# Patient Record
Sex: FEMALE | Race: WHITE | NOT HISPANIC OR LATINO | Employment: FULL TIME | URBAN - METROPOLITAN AREA
[De-identification: names, ages, dates, MRNs, and addresses within clinical notes are randomized per-mention and may not be internally consistent; named-entity substitution may affect disease eponyms.]

---

## 2017-05-09 RX ORDER — LANSOPRAZOLE 30 MG/1
30 CAPSULE, DELAYED RELEASE ORAL DAILY
Qty: 30 CAPSULE | Refills: 0 | Status: SHIPPED | OUTPATIENT
Start: 2017-05-09 | End: 2017-06-01 | Stop reason: SDUPTHER

## 2017-05-15 RX ORDER — LANSOPRAZOLE 30 MG/1
30 CAPSULE, DELAYED RELEASE ORAL DAILY
Qty: 30 CAPSULE | Refills: 0 | Status: SHIPPED | OUTPATIENT
Start: 2017-05-15 | End: 2017-05-17 | Stop reason: SDUPTHER

## 2017-05-17 RX ORDER — LANSOPRAZOLE 30 MG/1
30 CAPSULE, DELAYED RELEASE ORAL DAILY
Qty: 90 CAPSULE | Refills: 0 | Status: SHIPPED | OUTPATIENT
Start: 2017-05-17 | End: 2017-06-01 | Stop reason: SDUPTHER

## 2017-05-30 DIAGNOSIS — K21.9 GASTROESOPHAGEAL REFLUX DISEASE WITHOUT ESOPHAGITIS: ICD-10-CM

## 2017-05-30 DIAGNOSIS — E78.5 HYPERLIPIDEMIA, UNSPECIFIED HYPERLIPIDEMIA TYPE: Primary | ICD-10-CM

## 2017-05-31 LAB
ALBUMIN SERPL-MCNC: 4.6 G/DL (ref 3.5–5.2)
ALBUMIN/GLOB SERPL: 1.6 G/DL
ALP SERPL-CCNC: 60 U/L (ref 39–117)
ALT SERPL-CCNC: 7 U/L (ref 1–33)
AST SERPL-CCNC: 15 U/L (ref 1–32)
BASOPHILS # BLD AUTO: 0.06 10*3/MM3 (ref 0–0.2)
BASOPHILS NFR BLD AUTO: 1.1 % (ref 0–1.5)
BILIRUB SERPL-MCNC: 0.4 MG/DL (ref 0.1–1.2)
BUN SERPL-MCNC: 13 MG/DL (ref 6–20)
BUN/CREAT SERPL: 16.5 (ref 7–25)
CALCIUM SERPL-MCNC: 9.6 MG/DL (ref 8.6–10.5)
CHLORIDE SERPL-SCNC: 101 MMOL/L (ref 98–107)
CHOLEST SERPL-MCNC: 193 MG/DL (ref 0–200)
CO2 SERPL-SCNC: 25.6 MMOL/L (ref 22–29)
CREAT SERPL-MCNC: 0.79 MG/DL (ref 0.57–1)
EOSINOPHIL # BLD AUTO: 0.36 10*3/MM3 (ref 0–0.7)
EOSINOPHIL NFR BLD AUTO: 6.8 % (ref 0.3–6.2)
ERYTHROCYTE [DISTWIDTH] IN BLOOD BY AUTOMATED COUNT: 13.1 % (ref 11.7–13)
GLOBULIN SER CALC-MCNC: 2.8 GM/DL
GLUCOSE SERPL-MCNC: 94 MG/DL (ref 65–99)
HCT VFR BLD AUTO: 40.8 % (ref 35.6–45.5)
HDLC SERPL-MCNC: 75 MG/DL (ref 40–60)
HGB BLD-MCNC: 13.3 G/DL (ref 11.9–15.5)
IMM GRANULOCYTES # BLD: 0 10*3/MM3 (ref 0–0.03)
IMM GRANULOCYTES NFR BLD: 0 % (ref 0–0.5)
INTERPRETATION: NORMAL
LDLC SERPL CALC-MCNC: 103 MG/DL (ref 0–100)
LYMPHOCYTES # BLD AUTO: 1.53 10*3/MM3 (ref 0.9–4.8)
LYMPHOCYTES NFR BLD AUTO: 28.7 % (ref 19.6–45.3)
MCH RBC QN AUTO: 31 PG (ref 26.9–32)
MCHC RBC AUTO-ENTMCNC: 32.6 G/DL (ref 32.4–36.3)
MCV RBC AUTO: 95.1 FL (ref 80.5–98.2)
MONOCYTES # BLD AUTO: 0.37 10*3/MM3 (ref 0.2–1.2)
MONOCYTES NFR BLD AUTO: 6.9 % (ref 5–12)
NEUTROPHILS # BLD AUTO: 3.01 10*3/MM3 (ref 1.9–8.1)
NEUTROPHILS NFR BLD AUTO: 56.5 % (ref 42.7–76)
PLATELET # BLD AUTO: 192 10*3/MM3 (ref 140–500)
POTASSIUM SERPL-SCNC: 4.4 MMOL/L (ref 3.5–5.2)
PROT SERPL-MCNC: 7.4 G/DL (ref 6–8.5)
RBC # BLD AUTO: 4.29 10*6/MM3 (ref 3.9–5.2)
SODIUM SERPL-SCNC: 140 MMOL/L (ref 136–145)
TRIGL SERPL-MCNC: 77 MG/DL (ref 0–150)
VLDLC SERPL CALC-MCNC: 15.4 MG/DL (ref 5–40)
WBC # BLD AUTO: 5.33 10*3/MM3 (ref 4.5–10.7)

## 2017-06-01 ENCOUNTER — OFFICE VISIT (OUTPATIENT)
Dept: FAMILY MEDICINE CLINIC | Facility: CLINIC | Age: 47
End: 2017-06-01

## 2017-06-01 VITALS
WEIGHT: 126 LBS | OXYGEN SATURATION: 98 % | SYSTOLIC BLOOD PRESSURE: 108 MMHG | HEIGHT: 64 IN | HEART RATE: 94 BPM | BODY MASS INDEX: 21.51 KG/M2 | DIASTOLIC BLOOD PRESSURE: 80 MMHG | RESPIRATION RATE: 14 BRPM | TEMPERATURE: 98.4 F

## 2017-06-01 DIAGNOSIS — H60.542 ECZEMA OF EXTERNAL EAR, LEFT: ICD-10-CM

## 2017-06-01 DIAGNOSIS — K21.9 GASTROESOPHAGEAL REFLUX DISEASE WITHOUT ESOPHAGITIS: Primary | ICD-10-CM

## 2017-06-01 DIAGNOSIS — L40.9 PSORIASIS OF SCALP: ICD-10-CM

## 2017-06-01 PROBLEM — M11.20 PSEUDOGOUT: Status: ACTIVE | Noted: 2017-03-20

## 2017-06-01 PROCEDURE — 99214 OFFICE O/P EST MOD 30 MIN: CPT | Performed by: INTERNAL MEDICINE

## 2017-06-01 RX ORDER — LANSOPRAZOLE 30 MG/1
30 CAPSULE, DELAYED RELEASE ORAL DAILY
Qty: 90 CAPSULE | Refills: 1 | Status: SHIPPED | OUTPATIENT
Start: 2017-06-01 | End: 2018-02-01 | Stop reason: SDUPTHER

## 2017-06-01 NOTE — PROGRESS NOTES
Subjective if complaint is a refill on medicine for reflux  Edna Moreno is a 46 y.o. female.     History of Present Illness   Edna is here today for a medication refill.  She basically has been feeling well.  Her lansoprazole controls her reflux fairly well.  She did have some blood work drawn a few days ago.  Her LDL cholesterol is minimally elevated 103 but her HDL was 75 and her total cholesterol was normal.  Her chemistries looked okay and her blood count looked okay other than an elevated eosinophil count.  She is complaining of some itching in her left ear.  She is also developed what she thinks is psoriasis on the scalp.  Since her last visit she did have surgery on a nodule at the base of her right thumb.  This came back as calcifications consistent with pseudogout.  The following portions of the patient's history were reviewed and updated as appropriate: allergies, current medications, past medical history, past surgical history and problem list.    Review of Systems   Respiratory: Negative for chest tightness and shortness of breath.    Cardiovascular: Negative for chest pain and leg swelling.   Gastrointestinal: Negative for blood in stool.   Genitourinary:        She will be getting her Pap smear this afternoon.   Neurological: Negative for dizziness, light-headedness and headaches.       Objective   Physical Exam   Constitutional: She appears well-developed and well-nourished.   Neck: Carotid bruit is not present. No thyromegaly present.   Cardiovascular: Normal rate, regular rhythm and normal heart sounds.  Exam reveals no gallop and no friction rub.    No murmur heard.  Pulmonary/Chest: Effort normal and breath sounds normal. No respiratory distress. She has no wheezes. She has no rales.   Abdominal: Soft. Bowel sounds are normal. She exhibits no distension. There is no tenderness.   Musculoskeletal: She exhibits no edema.   Is mild crepitation of both knees.  Good range of motion   Skin:    There appears to be an eczematous dermatitis in the left external auditory canal.  She does have some scalp psoriasis.   Nursing note and vitals reviewed.      Assessment/Plan   Edna was seen today for med management and ear problem.    Diagnoses and all orders for this visit:    Gastroesophageal reflux disease without esophagitis    Eczema of external ear, left    Psoriasis of scalp    Other orders  -     lansoprazole (PREVACID) 30 MG capsule; Take 1 capsule by mouth Daily.  -     triamcinolone (KENALOG) 0.1 % ointment; Apply  topically 2 (Two) Times a Day.        Edna is here today for a follow-up.  We did renew her medicine for her reflux.  I am prescribing some triamcinolone ointment for her eczema  Alternative now.  She is going to try some shampoo for the scalp psoriasis.  If it does not improve with this then we will give her a prescription strength medicine.

## 2018-02-01 RX ORDER — LANSOPRAZOLE 30 MG/1
30 CAPSULE, DELAYED RELEASE ORAL DAILY
Qty: 30 CAPSULE | Refills: 0 | Status: SHIPPED | OUTPATIENT
Start: 2018-02-01 | End: 2018-02-19 | Stop reason: SDUPTHER

## 2018-02-19 ENCOUNTER — OFFICE VISIT (OUTPATIENT)
Dept: FAMILY MEDICINE CLINIC | Facility: CLINIC | Age: 48
End: 2018-02-19

## 2018-02-19 VITALS
OXYGEN SATURATION: 99 % | DIASTOLIC BLOOD PRESSURE: 76 MMHG | SYSTOLIC BLOOD PRESSURE: 120 MMHG | HEIGHT: 64 IN | TEMPERATURE: 98.3 F | BODY MASS INDEX: 22.02 KG/M2 | WEIGHT: 129 LBS | HEART RATE: 73 BPM

## 2018-02-19 DIAGNOSIS — E78.5 HYPERLIPIDEMIA, UNSPECIFIED HYPERLIPIDEMIA TYPE: ICD-10-CM

## 2018-02-19 DIAGNOSIS — R07.89 CHEST WALL PAIN: ICD-10-CM

## 2018-02-19 DIAGNOSIS — R10.11 RIGHT UPPER QUADRANT PAIN: ICD-10-CM

## 2018-02-19 DIAGNOSIS — K21.9 GASTROESOPHAGEAL REFLUX DISEASE WITHOUT ESOPHAGITIS: Primary | ICD-10-CM

## 2018-02-19 PROCEDURE — 99214 OFFICE O/P EST MOD 30 MIN: CPT | Performed by: INTERNAL MEDICINE

## 2018-02-19 RX ORDER — LANSOPRAZOLE 30 MG/1
30 CAPSULE, DELAYED RELEASE ORAL DAILY
Qty: 90 CAPSULE | Refills: 3 | Status: SHIPPED | OUTPATIENT
Start: 2018-02-19 | End: 2019-03-07 | Stop reason: SDUPTHER

## 2018-02-19 RX ORDER — ALUMINUM CHLORIDE 20 %
SOLUTION, NON-ORAL TOPICAL
Refills: 12 | COMMUNITY
Start: 2018-01-09 | End: 2022-09-21

## 2018-02-19 NOTE — PROGRESS NOTES
Subjective chief complaint is medication refill  Edna Moreno is a 47 y.o. female.     History of Present Illness   Edna is here today for need of a medication refill on her lansoprazole.  She has been on this for reflux for a number of years.  She is recently become concerned about the possibility of vitamin B12 deficiency or magnesium deficiency based on this.  She is not really having any symptoms related to this.  She is having various aches and pains.  She has had some discomfort in her left posterior rib cage since approximately [place].  Taking 7 days of meloxicam did seem to help a little bit with this but it is not completely gone.  She is also having some intermittent right upper quadrant pain after eating.  She had a normal ultrasound of the abdomen in 2014.  She is not having any blood in her stool.  She has no history of ulcers.  She has these episodes maybe a few times per month.  She also is complaining of some tenderness elbow which comes and goes.  She is now getting some medial epicondylitis type symptoms as well.  She is complaining of some pain in her back.  This is in the region of the posterior superior iliac crest.  She is also complaining of some left knee discomfort.  Around [place] she did have some problems with an irregular heartbeat.  One of these times was associated with some increased alcohol use but the other time was not.  The following portions of the patient's history were reviewed and updated as appropriate: allergies, current medications, past medical history, past surgical history and problem list.    Review of Systems   Respiratory: Negative for chest tightness and shortness of breath.    Cardiovascular: Positive for palpitations. Negative for chest pain.   Gastrointestinal: Positive for abdominal pain. Negative for blood in stool and diarrhea.   Musculoskeletal: Positive for myalgias.   Neurological: Negative for dizziness, light-headedness and headaches.        Objective   Physical Exam   Constitutional: She appears well-developed and well-nourished.   Neck: Carotid bruit is not present. No thyromegaly present.   Cardiovascular: Normal rate, regular rhythm, normal heart sounds and intact distal pulses.  Exam reveals no gallop and no friction rub.    No murmur heard.  Pulmonary/Chest: Effort normal and breath sounds normal. No respiratory distress. She has no wheezes. She has no rales.   Abdominal: Soft. Bowel sounds are normal. She exhibits no distension. There is no tenderness. There is no rebound and no guarding.   Naranjo's sign is negative.   Musculoskeletal: She exhibits no edema.   She has normal range of motion of the shoulders elbows knees and hips.  There is some minor crepitation of the knees.  I do not find any focal tenderness in terms of her back discomfort.     Nursing note and vitals reviewed.      Assessment/Plan   Edna was seen today for med refill.    Diagnoses and all orders for this visit:    Gastroesophageal reflux disease without esophagitis  -     CBC & Differential  -     Vitamin B12  -     Methylmalonic Acid, Serum  -     Magnesium  -     Vitamin D 25 Hydroxy    Right upper quadrant pain  -     NM HIDA scan with pharmacological intervention; Future    Hyperlipidemia, unspecified hyperlipidemia type  -     Comprehensive Metabolic Panel  -     Lipid Panel    Chest wall pain  -     Vitamin D 25 Hydroxy    Other orders  -     lansoprazole (PREVACID) 30 MG capsule; Take 1 capsule by mouth Daily. Needs appt      Edna is here today for follow-up.  We did renew her Prevacid.  We are going to check vitamin B12 and magnesium levels because of her concern.  She does have some right upper quadrant discomfort that is intermittent.  She had a normal ultrasound of the gallbladder in the past.  We are going to perform a HIDA scan.  Her other various aches and pains I believe her just musculoskeletal aches and pains.

## 2018-02-21 LAB
25(OH)D3+25(OH)D2 SERPL-MCNC: 22 NG/ML (ref 30–100)
ALBUMIN SERPL-MCNC: 4.3 G/DL (ref 3.5–5.2)
ALBUMIN/GLOB SERPL: 2 G/DL
ALP SERPL-CCNC: 65 U/L (ref 39–117)
ALT SERPL-CCNC: 17 U/L (ref 1–33)
AST SERPL-CCNC: 19 U/L (ref 1–32)
BASOPHILS # BLD AUTO: 0.03 10*3/MM3 (ref 0–0.2)
BASOPHILS NFR BLD AUTO: 0.7 % (ref 0–1.5)
BILIRUB SERPL-MCNC: 0.3 MG/DL (ref 0.1–1.2)
BUN SERPL-MCNC: 10 MG/DL (ref 6–20)
BUN/CREAT SERPL: 14.1 (ref 7–25)
CALCIUM SERPL-MCNC: 9 MG/DL (ref 8.6–10.5)
CHLORIDE SERPL-SCNC: 104 MMOL/L (ref 98–107)
CHOLEST SERPL-MCNC: 157 MG/DL (ref 0–200)
CO2 SERPL-SCNC: 26.3 MMOL/L (ref 22–29)
CREAT SERPL-MCNC: 0.71 MG/DL (ref 0.57–1)
EOSINOPHIL # BLD AUTO: 0.19 10*3/MM3 (ref 0–0.7)
EOSINOPHIL NFR BLD AUTO: 4.3 % (ref 0.3–6.2)
ERYTHROCYTE [DISTWIDTH] IN BLOOD BY AUTOMATED COUNT: 13.5 % (ref 11.7–13)
GFR SERPLBLD CREATININE-BSD FMLA CKD-EPI: 106 ML/MIN/1.73
GFR SERPLBLD CREATININE-BSD FMLA CKD-EPI: 87 ML/MIN/1.73
GLOBULIN SER CALC-MCNC: 2.2 GM/DL
GLUCOSE SERPL-MCNC: 89 MG/DL (ref 65–99)
HCT VFR BLD AUTO: 37.7 % (ref 35.6–45.5)
HDLC SERPL-MCNC: 56 MG/DL (ref 40–60)
HGB BLD-MCNC: 12.2 G/DL (ref 11.9–15.5)
IMM GRANULOCYTES # BLD: 0 10*3/MM3 (ref 0–0.03)
IMM GRANULOCYTES NFR BLD: 0 % (ref 0–0.5)
INTERPRETATION: NORMAL
LDLC SERPL CALC-MCNC: 89 MG/DL (ref 0–100)
LYMPHOCYTES # BLD AUTO: 1.31 10*3/MM3 (ref 0.9–4.8)
LYMPHOCYTES NFR BLD AUTO: 29.8 % (ref 19.6–45.3)
MAGNESIUM SERPL-MCNC: 2 MG/DL (ref 1.6–2.6)
MCH RBC QN AUTO: 31.1 PG (ref 26.9–32)
MCHC RBC AUTO-ENTMCNC: 32.4 G/DL (ref 32.4–36.3)
MCV RBC AUTO: 96.2 FL (ref 80.5–98.2)
METHYLMALONATE SERPL-SCNC: 310 NMOL/L (ref 0–378)
MONOCYTES # BLD AUTO: 0.32 10*3/MM3 (ref 0.2–1.2)
MONOCYTES NFR BLD AUTO: 7.3 % (ref 5–12)
NEUTROPHILS # BLD AUTO: 2.54 10*3/MM3 (ref 1.9–8.1)
NEUTROPHILS NFR BLD AUTO: 57.9 % (ref 42.7–76)
PLATELET # BLD AUTO: 198 10*3/MM3 (ref 140–500)
POTASSIUM SERPL-SCNC: 3.7 MMOL/L (ref 3.5–5.2)
PROT SERPL-MCNC: 6.5 G/DL (ref 6–8.5)
RBC # BLD AUTO: 3.92 10*6/MM3 (ref 3.9–5.2)
SODIUM SERPL-SCNC: 143 MMOL/L (ref 136–145)
TRIGL SERPL-MCNC: 58 MG/DL (ref 0–150)
VIT B12 SERPL-MCNC: 263 PG/ML (ref 211–946)
VLDLC SERPL CALC-MCNC: 11.6 MG/DL (ref 5–40)
WBC # BLD AUTO: 4.39 10*3/MM3 (ref 4.5–10.7)

## 2018-02-28 ENCOUNTER — HOSPITAL ENCOUNTER (OUTPATIENT)
Dept: NUCLEAR MEDICINE | Facility: HOSPITAL | Age: 48
Discharge: HOME | End: 2018-02-28
Attending: INTERNAL MEDICINE

## 2018-02-28 DIAGNOSIS — R10.11 RIGHT UPPER QUADRANT PAIN: ICD-10-CM

## 2018-02-28 PROCEDURE — 0 TECHNETIUM TC 99M MEBROFENIN KIT: Performed by: INTERNAL MEDICINE

## 2018-02-28 PROCEDURE — A9537 TC99M MEBROFENIN: HCPCS | Performed by: INTERNAL MEDICINE

## 2018-02-28 PROCEDURE — 78227 HEPATOBIL SYST IMAGE W/DRUG: CPT

## 2018-02-28 PROCEDURE — 25010000002 SINCALIDE PER 5 MCG: Performed by: INTERNAL MEDICINE

## 2018-02-28 RX ORDER — KIT FOR THE PREPARATION OF TECHNETIUM TC 99M MEBROFENIN 45 MG/10ML
1 INJECTION, POWDER, LYOPHILIZED, FOR SOLUTION INTRAVENOUS
Status: COMPLETED | OUTPATIENT
Start: 2018-02-28 | End: 2018-02-28

## 2018-02-28 RX ADMIN — SINCALIDE 1.2 MCG: 5 INJECTION, POWDER, LYOPHILIZED, FOR SOLUTION INTRAVENOUS at 09:30

## 2018-02-28 RX ADMIN — MEBROFENIN 1 DOSE: 45 INJECTION, POWDER, LYOPHILIZED, FOR SOLUTION INTRAVENOUS at 07:45

## 2018-03-01 ENCOUNTER — APPOINTMENT (OUTPATIENT)
Dept: NUCLEAR MEDICINE | Facility: HOSPITAL | Age: 48
End: 2018-03-01
Attending: INTERNAL MEDICINE

## 2019-02-27 RX ORDER — LANSOPRAZOLE 30 MG/1
CAPSULE, DELAYED RELEASE ORAL
Qty: 90 CAPSULE | Refills: 3 | OUTPATIENT
Start: 2019-02-27

## 2019-03-07 ENCOUNTER — OFFICE VISIT (OUTPATIENT)
Dept: FAMILY MEDICINE CLINIC | Facility: CLINIC | Age: 49
End: 2019-03-07

## 2019-03-07 VITALS
HEART RATE: 77 BPM | HEIGHT: 64 IN | BODY MASS INDEX: 22.94 KG/M2 | WEIGHT: 134.4 LBS | SYSTOLIC BLOOD PRESSURE: 142 MMHG | TEMPERATURE: 98.5 F | DIASTOLIC BLOOD PRESSURE: 90 MMHG | OXYGEN SATURATION: 99 %

## 2019-03-07 DIAGNOSIS — Z12.11 SCREENING FOR COLON CANCER: ICD-10-CM

## 2019-03-07 DIAGNOSIS — E78.5 HYPERLIPIDEMIA, UNSPECIFIED HYPERLIPIDEMIA TYPE: ICD-10-CM

## 2019-03-07 DIAGNOSIS — M62.838 CERVICAL PARASPINAL MUSCLE SPASM: ICD-10-CM

## 2019-03-07 DIAGNOSIS — E55.9 VITAMIN D DEFICIENCY: ICD-10-CM

## 2019-03-07 DIAGNOSIS — K21.9 GASTROESOPHAGEAL REFLUX DISEASE WITHOUT ESOPHAGITIS: Primary | ICD-10-CM

## 2019-03-07 PROCEDURE — 99214 OFFICE O/P EST MOD 30 MIN: CPT | Performed by: INTERNAL MEDICINE

## 2019-03-07 RX ORDER — LANSOPRAZOLE 30 MG/1
30 CAPSULE, DELAYED RELEASE ORAL DAILY
Qty: 90 CAPSULE | Refills: 3 | Status: SHIPPED | OUTPATIENT
Start: 2019-03-07 | End: 2020-02-26

## 2019-03-07 RX ORDER — TIZANIDINE 2 MG/1
2 TABLET ORAL NIGHTLY PRN
Qty: 30 TABLET | Refills: 1 | Status: SHIPPED | OUTPATIENT
Start: 2019-03-07 | End: 2019-05-08 | Stop reason: SDUPTHER

## 2019-03-07 NOTE — PROGRESS NOTES
Subjective Chief complaint is check up on reflux  Edna Moreno is a 48 y.o. female.     History of Present Illness   Edna is here today for checkup on her reflux.  She basically has been feeling well.  She is on Prevacid long-term and she seems to be doing fine with that.  Her only new complaint is one of some occipital headaches primarily on the right side of her head.  She has been having them approximately twice per week and sometimes they can last for a day.  She is not having any other associated symptoms with this.   She is up-to-date on her gynecologic and breast exams.  She is due for a colonoscopy.  She has seen Dr. Omer for her upper endoscopies in the past.  The following portions of the patient's history were reviewed and updated as appropriate: allergies, current medications, past family history, past medical history, past social history, past surgical history and problem list.    Review of Systems   Respiratory: Negative for chest tightness and shortness of breath.    Cardiovascular: Negative for chest pain and leg swelling.   Gastrointestinal: Positive for indigestion. Negative for abdominal pain and blood in stool.   Genitourinary: Negative for dysuria and hematuria.   Neurological: Positive for headache.       Objective   Physical Exam   Constitutional: She appears well-developed and well-nourished.   Neck: Carotid bruit is not present. No thyromegaly present.   Cardiovascular: Normal rate, regular rhythm, normal heart sounds and intact distal pulses. Exam reveals no friction rub.   No murmur heard.  Pulmonary/Chest: Effort normal and breath sounds normal. No respiratory distress. She has no wheezes.   Abdominal: Soft. Bowel sounds are normal. She exhibits no distension and no mass. There is no tenderness. There is no guarding.   Musculoskeletal: She exhibits no edema.   She does have a fairly tense cervical paraspinous muscle spasm on the right-hand side near the occiput.   Nursing note  and vitals reviewed.        Assessment/Plan   Edna was seen today for follow-up and med refill.    Diagnoses and all orders for this visit:    Gastroesophageal reflux disease without esophagitis    Hyperlipidemia, unspecified hyperlipidemia type  -     Comprehensive Metabolic Panel  -     Lipid Panel    Cervical paraspinal muscle spasm    Screening for colon cancer  -     Ambulatory Referral to Gastroenterology    Vitamin D deficiency  -     Vitamin D 25 Hydroxy    Other orders  -     lansoprazole (PREVACID) 30 MG capsule; Take 1 capsule by mouth Daily. Needs appt  -     tiZANidine (ZANAFLEX) 2 MG tablet; Take 1 tablet by mouth At Night As Needed for Muscle Spasms.      Edna is here today for follow-up on her reflux.  We are going to check on this as well as her cholesterol.  I am going to prescribe some tizanidine for nighttime use for her cervical muscle spasm.  We will get her referred back to Dr. Omer for her colonoscopy.

## 2019-03-08 LAB
25(OH)D3+25(OH)D2 SERPL-MCNC: 24.8 NG/ML (ref 30–100)
ALBUMIN SERPL-MCNC: 4.3 G/DL (ref 3.5–5.2)
ALBUMIN/GLOB SERPL: 1.6 G/DL
ALP SERPL-CCNC: 63 U/L (ref 39–117)
ALT SERPL-CCNC: 8 U/L (ref 1–33)
AST SERPL-CCNC: 11 U/L (ref 1–32)
BILIRUB SERPL-MCNC: 0.3 MG/DL (ref 0.1–1.2)
BUN SERPL-MCNC: 9 MG/DL (ref 6–20)
BUN/CREAT SERPL: 11.8 (ref 7–25)
CALCIUM SERPL-MCNC: 9.3 MG/DL (ref 8.6–10.5)
CHLORIDE SERPL-SCNC: 102 MMOL/L (ref 98–107)
CHOLEST SERPL-MCNC: 202 MG/DL (ref 0–200)
CO2 SERPL-SCNC: 23 MMOL/L (ref 22–29)
CREAT SERPL-MCNC: 0.76 MG/DL (ref 0.57–1)
GLOBULIN SER CALC-MCNC: 2.7 GM/DL
GLUCOSE SERPL-MCNC: 89 MG/DL (ref 65–99)
HDLC SERPL-MCNC: 65 MG/DL (ref 40–60)
LDLC SERPL CALC-MCNC: 115 MG/DL (ref 0–100)
POTASSIUM SERPL-SCNC: 4.6 MMOL/L (ref 3.5–5.2)
PROT SERPL-MCNC: 7 G/DL (ref 6–8.5)
SODIUM SERPL-SCNC: 140 MMOL/L (ref 136–145)
TRIGL SERPL-MCNC: 111 MG/DL (ref 0–150)
VLDLC SERPL CALC-MCNC: 22.2 MG/DL (ref 5–40)

## 2019-03-14 ENCOUNTER — PREP FOR SURGERY (OUTPATIENT)
Dept: OTHER | Facility: HOSPITAL | Age: 49
End: 2019-03-14

## 2019-03-14 DIAGNOSIS — Z12.11 ENCOUNTER FOR SCREENING FOR MALIGNANT NEOPLASM OF COLON: Primary | ICD-10-CM

## 2019-03-14 RX ORDER — SODIUM CHLORIDE, SODIUM LACTATE, POTASSIUM CHLORIDE, CALCIUM CHLORIDE 600; 310; 30; 20 MG/100ML; MG/100ML; MG/100ML; MG/100ML
30 INJECTION, SOLUTION INTRAVENOUS CONTINUOUS
Status: CANCELLED | OUTPATIENT
Start: 2019-04-26

## 2019-04-19 ENCOUNTER — TELEPHONE (OUTPATIENT)
Dept: GASTROENTEROLOGY | Facility: CLINIC | Age: 49
End: 2019-04-19

## 2019-04-26 ENCOUNTER — ANESTHESIA (OUTPATIENT)
Dept: GASTROENTEROLOGY | Facility: HOSPITAL | Age: 49
End: 2019-04-26

## 2019-04-26 ENCOUNTER — HOSPITAL ENCOUNTER (OUTPATIENT)
Facility: HOSPITAL | Age: 49
Setting detail: HOSPITAL OUTPATIENT SURGERY
Discharge: HOME | End: 2019-04-26
Attending: INTERNAL MEDICINE | Admitting: INTERNAL MEDICINE

## 2019-04-26 ENCOUNTER — ANESTHESIA EVENT (OUTPATIENT)
Dept: GASTROENTEROLOGY | Facility: HOSPITAL | Age: 49
End: 2019-04-26

## 2019-04-26 VITALS
OXYGEN SATURATION: 100 % | HEART RATE: 63 BPM | SYSTOLIC BLOOD PRESSURE: 120 MMHG | DIASTOLIC BLOOD PRESSURE: 79 MMHG | WEIGHT: 129 LBS | RESPIRATION RATE: 18 BRPM | BODY MASS INDEX: 22.02 KG/M2 | TEMPERATURE: 98.3 F | HEIGHT: 64 IN

## 2019-04-26 DIAGNOSIS — Z12.11 ENCOUNTER FOR SCREENING FOR MALIGNANT NEOPLASM OF COLON: ICD-10-CM

## 2019-04-26 LAB
B-HCG UR QL: NEGATIVE
INTERNAL NEGATIVE CONTROL: NEGATIVE
INTERNAL POSITIVE CONTROL: POSITIVE
Lab: NORMAL

## 2019-04-26 PROCEDURE — 45385 COLONOSCOPY W/LESION REMOVAL: CPT | Performed by: INTERNAL MEDICINE

## 2019-04-26 PROCEDURE — 88305 TISSUE EXAM BY PATHOLOGIST: CPT | Performed by: INTERNAL MEDICINE

## 2019-04-26 PROCEDURE — 25010000002 PROPOFOL 10 MG/ML EMULSION: Performed by: ANESTHESIOLOGY

## 2019-04-26 PROCEDURE — 81025 URINE PREGNANCY TEST: CPT | Performed by: INTERNAL MEDICINE

## 2019-04-26 RX ORDER — SODIUM CHLORIDE, SODIUM LACTATE, POTASSIUM CHLORIDE, CALCIUM CHLORIDE 600; 310; 30; 20 MG/100ML; MG/100ML; MG/100ML; MG/100ML
30 INJECTION, SOLUTION INTRAVENOUS CONTINUOUS
Status: DISCONTINUED | OUTPATIENT
Start: 2019-04-26 | End: 2019-04-26 | Stop reason: HOSPADM

## 2019-04-26 RX ORDER — SODIUM CHLORIDE, SODIUM LACTATE, POTASSIUM CHLORIDE, CALCIUM CHLORIDE 600; 310; 30; 20 MG/100ML; MG/100ML; MG/100ML; MG/100ML
30 INJECTION, SOLUTION INTRAVENOUS CONTINUOUS PRN
Status: DISCONTINUED | OUTPATIENT
Start: 2019-04-26 | End: 2019-04-26 | Stop reason: HOSPADM

## 2019-04-26 RX ORDER — PROPOFOL 10 MG/ML
VIAL (ML) INTRAVENOUS CONTINUOUS PRN
Status: DISCONTINUED | OUTPATIENT
Start: 2019-04-26 | End: 2019-04-26 | Stop reason: SURG

## 2019-04-26 RX ORDER — LIDOCAINE HYDROCHLORIDE 20 MG/ML
INJECTION, SOLUTION INFILTRATION; PERINEURAL AS NEEDED
Status: DISCONTINUED | OUTPATIENT
Start: 2019-04-26 | End: 2019-04-26 | Stop reason: SURG

## 2019-04-26 RX ORDER — PROPOFOL 10 MG/ML
VIAL (ML) INTRAVENOUS AS NEEDED
Status: DISCONTINUED | OUTPATIENT
Start: 2019-04-26 | End: 2019-04-26 | Stop reason: SURG

## 2019-04-26 RX ADMIN — PROPOFOL 100 MG: 10 INJECTION, EMULSION INTRAVENOUS at 09:21

## 2019-04-26 RX ADMIN — SODIUM CHLORIDE, POTASSIUM CHLORIDE, SODIUM LACTATE AND CALCIUM CHLORIDE 30 ML/HR: 600; 310; 30; 20 INJECTION, SOLUTION INTRAVENOUS at 08:49

## 2019-04-26 RX ADMIN — PROPOFOL 100 MCG/KG/MIN: 10 INJECTION, EMULSION INTRAVENOUS at 09:20

## 2019-04-26 RX ADMIN — LIDOCAINE HYDROCHLORIDE 60 MG: 20 INJECTION, SOLUTION INFILTRATION; PERINEURAL at 09:16

## 2019-04-26 NOTE — ANESTHESIA POSTPROCEDURE EVALUATION
"Patient: Edna Moreno    Procedure Summary     Date:  04/26/19 Room / Location:  [facility] ENDOSCOPY 1 /  [facility]    Anesthesia Start:  0915 Anesthesia Stop:  0956    Procedure:  COLONOSCOPY into cecum with cold snare polypectomies x4, one unretrieved polyp from ascending colon (N/A ) Diagnosis:       Encounter for screening for malignant neoplasm of colon      (Encounter for screening for malignant neoplasm of colon [Z12.11])    Surgeon:  Devang Omer MD Provider:  Shravan Jacobs MD    Anesthesia Type:  MAC ASA Status:  2          Anesthesia Type: MAC  Last vitals  BP   100/68 (04/26/19 0953)   Temp   36.8 °C (98.3 °F) (04/26/19 0838)   Pulse   73 (04/26/19 0953)   Resp   18 (04/26/19 0953)     SpO2   97 % (04/26/19 0953)     Post Anesthesia Care and Evaluation    Patient location during evaluation: PACU  Patient participation: complete - patient participated  Level of consciousness: awake  Pain score: 0  Pain management: adequate  Airway patency: patent  Anesthetic complications: No anesthetic complications  PONV Status: none  Cardiovascular status: acceptable  Respiratory status: acceptable  Hydration status: acceptable    Comments: /68 (BP Location: Left arm, Patient Position: Lying)   Pulse 73   Temp 36.8 °C (98.3 °F) (Oral)   Resp 18   Ht 162.6 cm (64\")   Wt 58.5 kg (129 lb)   SpO2 97%   BMI 22.14 kg/m²       "

## 2019-04-26 NOTE — ANESTHESIA PREPROCEDURE EVALUATION
Anesthesia Evaluation     Patient summary reviewed and Nursing notes reviewed                Airway   Mallampati: I  TM distance: >3 FB  Neck ROM: full  No difficulty expected  Dental - normal exam     Pulmonary - negative pulmonary ROS and normal exam   Cardiovascular - normal exam    (+) hyperlipidemia,       Neuro/Psych- negative ROS  GI/Hepatic/Renal/Endo    (+)  GERD,      Musculoskeletal     Abdominal  - normal exam    Bowel sounds: normal.   Substance History - negative use     OB/GYN negative ob/gyn ROS         Other   (+) arthritis                     Anesthesia Plan    ASA 2     MAC     Anesthetic plan, all risks, benefits, and alternatives have been provided, discussed and informed consent has been obtained with: patient.

## 2019-04-29 LAB
CYTO UR: NORMAL
LAB AP CASE REPORT: NORMAL
PATH REPORT.FINAL DX SPEC: NORMAL
PATH REPORT.GROSS SPEC: NORMAL

## 2019-05-08 RX ORDER — TIZANIDINE 2 MG/1
2 TABLET ORAL NIGHTLY PRN
Qty: 30 TABLET | Refills: 1 | Status: SHIPPED | OUTPATIENT
Start: 2019-05-08 | End: 2022-09-21

## 2019-06-21 ENCOUNTER — OFFICE VISIT (OUTPATIENT)
Dept: FAMILY MEDICINE CLINIC | Facility: CLINIC | Age: 49
End: 2019-06-21

## 2019-06-21 VITALS
OXYGEN SATURATION: 99 % | HEIGHT: 64 IN | SYSTOLIC BLOOD PRESSURE: 110 MMHG | WEIGHT: 131 LBS | TEMPERATURE: 98.1 F | HEART RATE: 87 BPM | BODY MASS INDEX: 22.36 KG/M2 | DIASTOLIC BLOOD PRESSURE: 70 MMHG

## 2019-06-21 DIAGNOSIS — L60.3 DYSTROPHIC NAIL: ICD-10-CM

## 2019-06-21 DIAGNOSIS — M25.441 SWELLING OF JOINT OF HAND, RIGHT: Primary | ICD-10-CM

## 2019-06-21 PROCEDURE — 99213 OFFICE O/P EST LOW 20 MIN: CPT | Performed by: INTERNAL MEDICINE

## 2019-06-21 NOTE — PROGRESS NOTES
Subjective Chief complaint is swelling of the knuckle and nail problem  Edna Moreno is a 48 y.o. female.     History of Present Illness   Edna is here today for a  Mildly painful swollen right third distal interphalangeal joint with a nail on the same finger showing some divots.  This is been present for several weeks.  She does not recall any injury to the nail.  She does not have any known history of psoriasis.  She does have a prior history of pseudogout.    The following portions of the patient's history were reviewed and updated as appropriate: allergies, current medications, past family history, past medical history, past social history, past surgical history and problem list.    Review of Systems   Skin: Negative for color change and rash.       Objective   Physical Exam   Musculoskeletal:   There is very minimal swelling of the right third PIP joint.  It does have fairly good range of motion no crepitation.   Skin:   There are crescent-shaped divots in the right third nail.  There is no pitting of the nail.   Nursing note and vitals reviewed.        Assessment/Plan   Edna was seen today for joint swelling.    Diagnoses and all orders for this visit:    Swelling of joint of hand, right  -     Rheumatoid Factor  -     Uric Acid  -     Sedimentation Rate  -     C-reactive Protein  -     XR finger 2+ vw right; Future    Dystrophic nail  -     XR finger 2+ vw right; Future    Edna is here today for swelling of her right third knuckle and nail deformity.  I suspect the nail deformity is due to trauma.  With her history of pseudogout I am going to x-ray the joint.  This does not appear to be a psoriatic nail.  I am going to check a few inflammatory markers.

## 2019-06-22 ENCOUNTER — HOSPITAL ENCOUNTER (OUTPATIENT)
Dept: GENERAL RADIOLOGY | Facility: HOSPITAL | Age: 49
Discharge: HOME | End: 2019-06-22
Admitting: INTERNAL MEDICINE

## 2019-06-22 DIAGNOSIS — L60.3 DYSTROPHIC NAIL: ICD-10-CM

## 2019-06-22 DIAGNOSIS — M25.441 SWELLING OF JOINT OF HAND, RIGHT: ICD-10-CM

## 2019-06-22 LAB
CRP SERPL-MCNC: 0.54 MG/DL (ref 0–0.5)
ERYTHROCYTE [SEDIMENTATION RATE] IN BLOOD BY WESTERGREN METHOD: 9 MM/HR (ref 0–20)
RHEUMATOID FACT SERPL-ACNC: 20.4 IU/ML (ref 0–13.9)
URATE SERPL-MCNC: 3.4 MG/DL (ref 2.4–5.7)

## 2019-06-22 PROCEDURE — 73140 X-RAY EXAM OF FINGER(S): CPT

## 2019-06-24 DIAGNOSIS — M25.441 SWELLING OF JOINT OF HAND, RIGHT: Primary | ICD-10-CM

## 2019-06-28 LAB
CCP IGA+IGG SERPL IA-ACNC: 5 UNITS (ref 0–19)
Lab: NORMAL
WRITTEN AUTHORIZATION: NORMAL

## 2019-08-28 ENCOUNTER — OFFICE VISIT (OUTPATIENT)
Dept: FAMILY MEDICINE CLINIC | Facility: CLINIC | Age: 49
End: 2019-08-28

## 2019-08-28 VITALS
HEIGHT: 64 IN | TEMPERATURE: 97.9 F | BODY MASS INDEX: 21.65 KG/M2 | HEART RATE: 84 BPM | DIASTOLIC BLOOD PRESSURE: 88 MMHG | OXYGEN SATURATION: 99 % | WEIGHT: 126.8 LBS | SYSTOLIC BLOOD PRESSURE: 124 MMHG

## 2019-08-28 DIAGNOSIS — F41.9 ANXIETY: Primary | ICD-10-CM

## 2019-08-28 DIAGNOSIS — F43.9 STRESS: ICD-10-CM

## 2019-08-28 PROCEDURE — 99214 OFFICE O/P EST MOD 30 MIN: CPT | Performed by: INTERNAL MEDICINE

## 2019-08-28 RX ORDER — ESCITALOPRAM OXALATE 10 MG/1
10 TABLET ORAL DAILY
Qty: 30 TABLET | Refills: 5 | Status: SHIPPED | OUTPATIENT
Start: 2019-08-28 | End: 2019-10-09 | Stop reason: SDUPTHER

## 2019-08-28 RX ORDER — LORAZEPAM 0.5 MG/1
0.5 TABLET ORAL EVERY 12 HOURS PRN
Qty: 20 TABLET | Refills: 0 | Status: SHIPPED | OUTPATIENT
Start: 2019-08-28 | End: 2021-04-09

## 2019-08-28 NOTE — PROGRESS NOTES
Subjective Chief complaint is stress and anxiety  Edna Moreno is a 49 y.o. female.     History of Present Illness   Edna is here today for complaints of stress and anxiety.  She recently changed jobs.  She is still trying to learn the ins and outs of the job.  She feels a little bit more stressed at this job.  She has had problems like this in the remote past.  She did take some Lexapro but she cannot recall whether it seemed to help or not.  She does report that she is not sleeping well.  She is waking up worrying about going to work the next day.  She does think that her symptoms are bad enough that she would like to try taking something for it.  Past medical history is remarkable for reflux.    Review of Systems   Respiratory: Negative for shortness of breath.    Cardiovascular: Negative for chest pain and palpitations.   Psychiatric/Behavioral: Positive for sleep disturbance. Negative for depressed mood. The patient is nervous/anxious.        Objective   Physical Exam   Constitutional: She is oriented to person, place, and time. She appears well-developed and well-nourished.   Neck: No thyromegaly present.   Cardiovascular: Normal rate and regular rhythm.   Pulmonary/Chest: Effort normal.   Neurological: She is alert and oriented to person, place, and time.   Psychiatric: Her behavior is normal. Judgment and thought content normal.   Nursing note and vitals reviewed.        Assessment/Plan   Edna was seen today for anxiety and stress.    Diagnoses and all orders for this visit:    Anxiety    Stress    Other orders  -     escitalopram (LEXAPRO) 10 MG tablet; Take 1 tablet by mouth Daily.  -     LORazepam (ATIVAN) 0.5 MG tablet; Take 1 tablet by mouth Every 12 (Twelve) Hours As Needed for Anxiety.    Edna is here today for stress and anxiety.  We did discuss various treatment options including the SSRI type medications as well as something such as BuSpar.  She is familiar with the generic Lexapro.   We are going to use this initially.  I am going to prescribe a small number of tablets of lorazepam to use on an as-needed basis until the medication kicks in.  She is going to contact me in 3 to 4 weeks and let me know how she is doing.

## 2019-09-09 ENCOUNTER — TELEPHONE (OUTPATIENT)
Dept: FAMILY MEDICINE CLINIC | Facility: CLINIC | Age: 49
End: 2019-09-09

## 2019-09-09 NOTE — TELEPHONE ENCOUNTER
----- Message from Emily Dudley sent at 9/9/2019  3:15 PM EDT -----  PT IS TAKING LEXAPRO AND IT IS AFFECTING HER VISION, CLOSE-UP TIMES ONE WEEK.  PLEASE CALL AND ADVISE PT OF WHAT TO DO CALL [phone]    I advised patient that she could decrease to 5 mg a day and then be off of it and see what happens with her vision.  We could then consider a trial of BuSpar.  She is going to see what that will cost.  .

## 2019-10-09 RX ORDER — ESCITALOPRAM OXALATE 10 MG/1
10 TABLET ORAL DAILY
Qty: 90 TABLET | Refills: 1 | Status: SHIPPED | OUTPATIENT
Start: 2019-10-09 | End: 2020-06-05 | Stop reason: SDUPTHER

## 2020-02-26 RX ORDER — LANSOPRAZOLE 30 MG/1
CAPSULE, DELAYED RELEASE ORAL
Qty: 90 CAPSULE | Refills: 0 | Status: SHIPPED | OUTPATIENT
Start: 2020-02-26 | End: 2020-06-05 | Stop reason: SDUPTHER

## 2020-05-27 RX ORDER — LANSOPRAZOLE 30 MG/1
CAPSULE, DELAYED RELEASE ORAL
Qty: 90 CAPSULE | Refills: 0 | OUTPATIENT
Start: 2020-05-27

## 2020-06-05 ENCOUNTER — OFFICE VISIT (OUTPATIENT)
Dept: FAMILY MEDICINE CLINIC | Facility: CLINIC | Age: 50
End: 2020-06-05

## 2020-06-05 VITALS
BODY MASS INDEX: 22.23 KG/M2 | WEIGHT: 130.2 LBS | DIASTOLIC BLOOD PRESSURE: 78 MMHG | SYSTOLIC BLOOD PRESSURE: 110 MMHG | TEMPERATURE: 97.5 F | HEIGHT: 64 IN

## 2020-06-05 DIAGNOSIS — E78.5 HYPERLIPIDEMIA, UNSPECIFIED HYPERLIPIDEMIA TYPE: Primary | ICD-10-CM

## 2020-06-05 DIAGNOSIS — K21.9 GASTROESOPHAGEAL REFLUX DISEASE WITHOUT ESOPHAGITIS: ICD-10-CM

## 2020-06-05 PROCEDURE — 99213 OFFICE O/P EST LOW 20 MIN: CPT | Performed by: INTERNAL MEDICINE

## 2020-06-05 RX ORDER — ESCITALOPRAM OXALATE 10 MG/1
10 TABLET ORAL DAILY
Qty: 90 TABLET | Refills: 3 | Status: SHIPPED | OUTPATIENT
Start: 2020-06-05 | End: 2021-04-09

## 2020-06-05 RX ORDER — LANSOPRAZOLE 30 MG/1
30 CAPSULE, DELAYED RELEASE ORAL DAILY
Qty: 90 CAPSULE | Refills: 3 | Status: SHIPPED | OUTPATIENT
Start: 2020-06-05 | End: 2021-05-25

## 2020-06-05 RX ORDER — ESTROGEN,CON/M-PROGEST ACET 0.625-2.5
1 TABLET ORAL DAILY
COMMUNITY
Start: 2020-04-17 | End: 2021-09-13

## 2020-06-05 NOTE — PROGRESS NOTES
Subjective Chief complaint is medication refills for reflux and anxiety  Edna Moreno is a 49 y.o. female.     History of Present Illness Edna is here today for medication refills.  She does have anxiety.  She takes generic Lexapro daily.  That seems to keep things fairly calm.  She has had a few episodes of palpitations where she is felt her heart beating strong in her chest but not necessarily fast.  Reflux currently is well controlled with lansoprazole.  She has had some hyperlipidemia but is not currently on any medication.  We did reviewher vaccines.  She appears to have had a Tdap in 2013 and I have added that manually.  We did discuss the shingles vaccine.    The following portions of the patient's history were reviewed and updated as appropriate: allergies, current medications, past family history, past medical history, past social history, past surgical history and problem list.    Review of Systems   Respiratory: Negative for chest tightness and shortness of breath.    Cardiovascular: Positive for palpitations. Negative for chest pain.   Neurological: Negative for dizziness, light-headedness and headache.       Objective   Physical Exam   Constitutional: She appears well-developed and well-nourished.   Neck: Carotid bruit is not present. No thyromegaly present.   Cardiovascular: Normal rate, regular rhythm, normal heart sounds and intact distal pulses. Exam reveals no friction rub.   No murmur heard.  Pulmonary/Chest: Effort normal and breath sounds normal. No respiratory distress. She has no wheezes.   Abdominal: Soft. Bowel sounds are normal. She exhibits no distension and no mass. There is no tenderness. There is no guarding.   Musculoskeletal: She exhibits no edema.   Nursing note and vitals reviewed.        Assessment/Plan   Edna was seen today for med refill.    Diagnoses and all orders for this visit:    Hyperlipidemia, unspecified hyperlipidemia type  -     Comprehensive Metabolic  Panel  -     Lipid Panel    Gastroesophageal reflux disease without esophagitis    Other orders  -     lansoprazole (PREVACID) 30 MG capsule; Take 1 capsule by mouth Daily.  -     escitalopram (LEXAPRO) 10 MG tablet; Take 1 tablet by mouth Daily.    Edna is here today for follow-up.  We did renew her medications.  We are going to check on her cholesterol today.  If all is well we can see her back in 1 year.

## 2020-06-06 LAB
ALBUMIN SERPL-MCNC: 4.5 G/DL (ref 3.5–5.2)
ALBUMIN/GLOB SERPL: 1.6 G/DL
ALP SERPL-CCNC: 54 U/L (ref 39–117)
ALT SERPL-CCNC: 6 U/L (ref 1–33)
AST SERPL-CCNC: 10 U/L (ref 1–32)
BILIRUB SERPL-MCNC: 0.4 MG/DL (ref 0.2–1.2)
BUN SERPL-MCNC: 10 MG/DL (ref 6–20)
BUN/CREAT SERPL: 11.8 (ref 7–25)
CALCIUM SERPL-MCNC: 9.5 MG/DL (ref 8.6–10.5)
CHLORIDE SERPL-SCNC: 105 MMOL/L (ref 98–107)
CHOLEST SERPL-MCNC: 213 MG/DL (ref 0–200)
CO2 SERPL-SCNC: 27.2 MMOL/L (ref 22–29)
CREAT SERPL-MCNC: 0.85 MG/DL (ref 0.57–1)
GLOBULIN SER CALC-MCNC: 2.9 GM/DL
GLUCOSE SERPL-MCNC: 96 MG/DL (ref 65–99)
HDLC SERPL-MCNC: 71 MG/DL (ref 40–60)
LDLC SERPL CALC-MCNC: 114 MG/DL (ref 0–100)
POTASSIUM SERPL-SCNC: 4.4 MMOL/L (ref 3.5–5.2)
PROT SERPL-MCNC: 7.4 G/DL (ref 6–8.5)
SODIUM SERPL-SCNC: 140 MMOL/L (ref 136–145)
TRIGL SERPL-MCNC: 141 MG/DL (ref 0–150)
VLDLC SERPL CALC-MCNC: 28.2 MG/DL

## 2021-03-10 ENCOUNTER — IMMUNIZATION (OUTPATIENT)
Dept: VACCINE CLINIC | Facility: HOSPITAL | Age: 51
End: 2021-03-10

## 2021-03-10 PROCEDURE — 0001A: CPT | Performed by: OBSTETRICS & GYNECOLOGY

## 2021-03-10 PROCEDURE — 91300 HC SARSCOV02 VAC 30MCG/0.3ML IM: CPT | Performed by: OBSTETRICS & GYNECOLOGY

## 2021-03-31 ENCOUNTER — IMMUNIZATION (OUTPATIENT)
Dept: VACCINE CLINIC | Facility: HOSPITAL | Age: 51
End: 2021-03-31

## 2021-03-31 PROCEDURE — 0002A: CPT | Performed by: OBSTETRICS & GYNECOLOGY

## 2021-03-31 PROCEDURE — 91300 HC SARSCOV02 VAC 30MCG/0.3ML IM: CPT | Performed by: OBSTETRICS & GYNECOLOGY

## 2021-04-09 ENCOUNTER — OFFICE VISIT (OUTPATIENT)
Dept: FAMILY MEDICINE CLINIC | Facility: CLINIC | Age: 51
End: 2021-04-09

## 2021-04-09 VITALS
RESPIRATION RATE: 18 BRPM | SYSTOLIC BLOOD PRESSURE: 122 MMHG | HEART RATE: 67 BPM | OXYGEN SATURATION: 100 % | WEIGHT: 138 LBS | DIASTOLIC BLOOD PRESSURE: 80 MMHG | TEMPERATURE: 97.8 F | HEIGHT: 64 IN | BODY MASS INDEX: 23.56 KG/M2
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
## 2021-04-09 DIAGNOSIS — S40.869A TICK BITE OF AXILLARY REGION, INITIAL ENCOUNTER: Primary | ICD-10-CM

## 2021-04-09 PROCEDURE — 99212 OFFICE O/P EST SF 10 MIN: CPT | Performed by: FAMILY MEDICINE

## 2021-04-09 RX ORDER — NORETHINDRONE ACETATE AND ETHINYL ESTRADIOL .5; 2.5 MG/1; UG/1
1 TABLET ORAL DAILY
COMMUNITY
Start: 2021-03-17

## 2021-04-09 NOTE — PROGRESS NOTES
HPI  Edna Moreno is a 50 y.o. female who is here for follow up of apparent tick bite patient patient noticed yesterday when felt scab removed and appears to be intact which was brought in.  Slight redness noted.  Concerned about Lyme disease etc.      Review of Systems   Skin:        With surrounding redness   All other systems reviewed and are negative.        Past Medical History:   Diagnosis Date   • Hiatal hernia    • PVC (premature ventricular contraction)        Past Surgical History:   Procedure Laterality Date   • COLONOSCOPY N/A 4/26/2019    Procedure: COLONOSCOPY into cecum with cold snare polypectomies x4, one unretrieved polyp from ascending colon;  Surgeon: Devang Omer MD;  Location: [facility] ENDOSCOPY;  Service: Gastroenterology   • WRIST SURGERY Left        No family history on file.    Social History     Socioeconomic History   • Marital status: Single     Spouse name: Not on file   • Number of children: Not on file   • Years of education: Not on file   • Highest education level: Not on file   Tobacco Use   • Smoking status: Never Smoker   Substance and Sexual Activity   • Alcohol use: Yes     Comment: socially        Vitals:    04/09/21 1125   BP: 122/80   Pulse: 67   Resp: 18   Temp: 97.8 °F (36.6 °C)   SpO2: 100%        Body mass index is 23.67 kg/m².      Physical Exam  Constitutional:       Appearance: Normal appearance.   Skin:     General: Skin is warm and dry.          Neurological:      General: No focal deficit present.      Mental Status: She is alert.   Psychiatric:         Mood and Affect: Mood normal.         Behavior: Behavior normal.         Thought Content: Thought content normal.         Judgment: Judgment normal.           Assessment/Plan    Diagnoses and all orders for this visit:    1. Tick bite of axillary region, initial encounter (Primary)      Patient presents with tick bite under the arm.  Very mild erythema noted with no evidence of residual tick parts.  Discussed  with patient do not really feel any further treatment necessary unless erythema increases or fails to resolve.  Sounds like to was present for less than 48 hours.    This note includes information entered using a voice recognition dictation system.  Though reviewed, some nonsensible errors may remain.

## 2021-05-25 RX ORDER — LANSOPRAZOLE 30 MG/1
CAPSULE, DELAYED RELEASE ORAL
Qty: 90 CAPSULE | Refills: 0 | Status: SHIPPED | OUTPATIENT
Start: 2021-05-25 | End: 2021-09-13 | Stop reason: SDUPTHER

## 2021-08-23 RX ORDER — LANSOPRAZOLE 30 MG/1
CAPSULE, DELAYED RELEASE ORAL
Qty: 90 CAPSULE | Refills: 0 | OUTPATIENT
Start: 2021-08-23

## 2021-08-23 NOTE — TELEPHONE ENCOUNTER
Rx Refill Note  Requested Prescriptions     Pending Prescriptions Disp Refills   • lansoprazole (PREVACID) 30 MG capsule [Pharmacy Med Name: LANSOPRAZOLE DR 30 MG CAPSULE] 90 capsule 0     Sig: TAKE 1 CAPSULE BY MOUTH EVERY DAY      Last office visit with prescribing clinician: 6/5/2020      Next office visit with prescribing clinician: Visit date not found            Agusto Flores MA  08/23/21, 07:04 EDT

## 2021-09-13 ENCOUNTER — OFFICE VISIT (OUTPATIENT)
Dept: FAMILY MEDICINE CLINIC | Facility: CLINIC | Age: 51
End: 2021-09-13

## 2021-09-13 VITALS
HEIGHT: 64 IN | WEIGHT: 136.8 LBS | BODY MASS INDEX: 23.35 KG/M2 | RESPIRATION RATE: 16 BRPM | SYSTOLIC BLOOD PRESSURE: 130 MMHG | DIASTOLIC BLOOD PRESSURE: 90 MMHG

## 2021-09-13 DIAGNOSIS — R03.0 ELEVATED BLOOD PRESSURE READING: ICD-10-CM

## 2021-09-13 DIAGNOSIS — E78.5 HYPERLIPIDEMIA, UNSPECIFIED HYPERLIPIDEMIA TYPE: Primary | ICD-10-CM

## 2021-09-13 DIAGNOSIS — K21.9 GASTROESOPHAGEAL REFLUX DISEASE WITHOUT ESOPHAGITIS: ICD-10-CM

## 2021-09-13 PROCEDURE — 99214 OFFICE O/P EST MOD 30 MIN: CPT | Performed by: INTERNAL MEDICINE

## 2021-09-13 RX ORDER — LANSOPRAZOLE 30 MG/1
30 CAPSULE, DELAYED RELEASE ORAL DAILY
Qty: 90 CAPSULE | Refills: 3 | Status: SHIPPED | OUTPATIENT
Start: 2021-09-13 | End: 2022-09-12

## 2021-09-13 NOTE — PATIENT INSTRUCTIONS
"Hypertension, Adult  High blood pressure (hypertension) is when the force of blood pumping through the arteries is too strong. The arteries are the blood vessels that carry blood from the heart throughout the body. Hypertension forces the heart to work harder to pump blood and may cause arteries to become narrow or stiff. Untreated or uncontrolled hypertension can cause a heart attack, heart failure, a stroke, kidney disease, and other problems.  A blood pressure reading consists of a higher number over a lower number. Ideally, your blood pressure should be below 120/80. The first (\"top\") number is called the systolic pressure. It is a measure of the pressure in your arteries as your heart beats. The second (\"bottom\") number is called the diastolic pressure. It is a measure of the pressure in your arteries as the heart relaxes.  What are the causes?  The exact cause of this condition is not known. There are some conditions that result in or are related to high blood pressure.  What increases the risk?  Some risk factors for high blood pressure are under your control. The following factors may make you more likely to develop this condition:  · Smoking.  · Having type 2 diabetes mellitus, high cholesterol, or both.  · Not getting enough exercise or physical activity.  · Being overweight.  · Having too much fat, sugar, calories, or salt (sodium) in your diet.  · Drinking too much alcohol.  Some risk factors for high blood pressure may be difficult or impossible to change. Some of these factors include:  · Having chronic kidney disease.  · Having a family history of high blood pressure.  · Age. Risk increases with age.  · Race. You may be at higher risk if you are .  · Gender. Men are at higher risk than women before age 45. After age 65, women are at higher risk than men.  · Having obstructive sleep apnea.  · Stress.  What are the signs or symptoms?  High blood pressure may not cause symptoms. Very high " blood pressure (hypertensive crisis) may cause:  · Headache.  · Anxiety.  · Shortness of breath.  · Nosebleed.  · Nausea and vomiting.  · Vision changes.  · Severe chest pain.  · Seizures.  How is this diagnosed?  This condition is diagnosed by measuring your blood pressure while you are seated, with your arm resting on a flat surface, your legs uncrossed, and your feet flat on the floor. The cuff of the blood pressure monitor will be placed directly against the skin of your upper arm at the level of your heart. It should be measured at least twice using the same arm. Certain conditions can cause a difference in blood pressure between your right and left arms.  Certain factors can cause blood pressure readings to be lower or higher than normal for a short period of time:  · When your blood pressure is higher when you are in a health care provider's office than when you are at home, this is called white coat hypertension. Most people with this condition do not need medicines.  · When your blood pressure is higher at home than when you are in a health care provider's office, this is called masked hypertension. Most people with this condition may need medicines to control blood pressure.  If you have a high blood pressure reading during one visit or you have normal blood pressure with other risk factors, you may be asked to:  · Return on a different day to have your blood pressure checked again.  · Monitor your blood pressure at home for 1 week or longer.  If you are diagnosed with hypertension, you may have other blood or imaging tests to help your health care provider understand your overall risk for other conditions.  How is this treated?  This condition is treated by making healthy lifestyle changes, such as eating healthy foods, exercising more, and reducing your alcohol intake. Your health care provider may prescribe medicine if lifestyle changes are not enough to get your blood pressure under control, and  if:  · Your systolic blood pressure is above 130.  · Your diastolic blood pressure is above 80.  Your personal target blood pressure may vary depending on your medical conditions, your age, and other factors.  Follow these instructions at home:  Eating and drinking    · Eat a diet that is high in fiber and potassium, and low in sodium, added sugar, and fat. An example eating plan is called the DASH (Dietary Approaches to Stop Hypertension) diet. To eat this way:  ? Eat plenty of fresh fruits and vegetables. Try to fill one half of your plate at each meal with fruits and vegetables.  ? Eat whole grains, such as whole-wheat pasta, brown rice, or whole-grain bread. Fill about one fourth of your plate with whole grains.  ? Eat or drink low-fat dairy products, such as skim milk or low-fat yogurt.  ? Avoid fatty cuts of meat, processed or cured meats, and poultry with skin. Fill about one fourth of your plate with lean proteins, such as fish, chicken without skin, beans, eggs, or tofu.  ? Avoid pre-made and processed foods. These tend to be higher in sodium, added sugar, and fat.  · Reduce your daily sodium intake. Most people with hypertension should eat less than 1,500 mg of sodium a day.  · Do not drink alcohol if:  ? Your health care provider tells you not to drink.  ? You are pregnant, may be pregnant, or are planning to become pregnant.  · If you drink alcohol:  ? Limit how much you use to:  § 0-1 drink a day for women.  § 0-2 drinks a day for men.  ? Be aware of how much alcohol is in your drink. In the [place] one drink equals one 12 oz bottle of beer (355 mL), one 5 oz glass of wine (148 mL), or one 1½ oz glass of hard liquor (44 mL).    Lifestyle    · Work with your health care provider to maintain a healthy body weight or to lose weight. Ask what an ideal weight is for you.  · Get at least 30 minutes of exercise most days of the week. Activities may include walking, swimming, or biking.  · Include exercise to  strengthen your muscles (resistance exercise), such as Pilates or lifting weights, as part of your weekly exercise routine. Try to do these types of exercises for 30 minutes at least 3 days a week.  · Do not use any products that contain nicotine or tobacco, such as cigarettes, e-cigarettes, and chewing tobacco. If you need help quitting, ask your health care provider.  · Monitor your blood pressure at home as told by your health care provider.  · Keep all follow-up visits as told by your health care provider. This is important.    Medicines  · Take over-the-counter and prescription medicines only as told by your health care provider. Follow directions carefully. Blood pressure medicines must be taken as prescribed.  · Do not skip doses of blood pressure medicine. Doing this puts you at risk for problems and can make the medicine less effective.  · Ask your health care provider about side effects or reactions to medicines that you should watch for.  Contact a health care provider if you:  · Think you are having a reaction to a medicine you are taking.  · Have headaches that keep coming back (recurring).  · Feel dizzy.  · Have swelling in your ankles.  · Have trouble with your vision.  Get help right away if you:  · Develop a severe headache or confusion.  · Have unusual weakness or numbness.  · Feel faint.  · Have severe pain in your chest or abdomen.  · Vomit repeatedly.  · Have trouble breathing.  Summary  · Hypertension is when the force of blood pumping through your arteries is too strong. If this condition is not controlled, it may put you at risk for serious complications.  · Your personal target blood pressure may vary depending on your medical conditions, your age, and other factors. For most people, a normal blood pressure is less than 120/80.  · Hypertension is treated with lifestyle changes, medicines, or a combination of both. Lifestyle changes include losing weight, eating a healthy, low-sodium diet,  exercising more, and limiting alcohol.  This information is not intended to replace advice given to you by your health care provider. Make sure you discuss any questions you have with your health care provider.  Document Revised: 08/28/2019 Document Reviewed: 08/28/2019  Elsevier Patient Education © 2021 [employer]

## 2021-09-13 NOTE — PROGRESS NOTES
Subjective Chief complaint is checkup on cholesterol but also some elevated blood pressures  Edna Moreno is a 51 y.o. female.     History of Present Illness Edna is here today for checkup on her cholesterol.  She has had some mild hyperlipidemia but is not required medication.  She has had some recent visits with dentist and other physicians and her blood pressure has been slightly elevated.  She did buy a home blood pressure monitor and they seem to be better at home.  She does report that she is working to increase number of hours.  She is not adding any more salt than usual to her food.  She does drink 1-2 diet soft drinks per day.    The following portions of the patient's history were reviewed and updated as appropriate: allergies, current medications, past family history, past medical history, past social history, past surgical history and problem list.    Review of Systems   Constitutional: Negative for chills and fever.   Respiratory: Negative for chest tightness and shortness of breath.    Cardiovascular: Negative for chest pain.   Gastrointestinal:        Her reflux currently seems to be controlled   Neurological: Negative for dizziness, light-headedness and headache.       Objective   Physical Exam  Constitutional:       Appearance: Normal appearance. She is well-developed.   Neck:      Thyroid: No thyromegaly.      Vascular: No carotid bruit.   Cardiovascular:      Rate and Rhythm: Normal rate and regular rhythm.      Heart sounds: Normal heart sounds. No murmur heard.   No friction rub.   Pulmonary:      Effort: Pulmonary effort is normal. No respiratory distress.      Breath sounds: Normal breath sounds. No wheezing.   Abdominal:      General: Bowel sounds are normal. There is no distension.      Palpations: Abdomen is soft. There is no mass.      Tenderness: There is no abdominal tenderness. There is no guarding.   Musculoskeletal:      Right lower leg: No edema.      Left lower leg: No  edema.   Neurological:      Mental Status: She is alert.           Assessment/Plan   Diagnoses and all orders for this visit:    1. Hyperlipidemia, unspecified hyperlipidemia type (Primary)  -     CBC & Differential    2. Gastroesophageal reflux disease without esophagitis    3. Elevated blood pressure reading  -     Comprehensive Metabolic Panel  -     Lipid Panel  -     TSH+Free T4    Other orders  -     lansoprazole (PREVACID) 30 MG capsule; Take 1 capsule by mouth Daily.  Dispense: 90 capsule; Refill: 3    Edna is here today for follow-up.  Her blood pressure is elevated a little bit.  I retook it at 128/82.  Her home cuff then took it at 139/89.  We are going to have her monitor her blood pressure at home.  I advised her to take it once a week and write those numbers down.  We will see her back in 3 months.  In the interim we will check some additional lab work.

## 2021-09-14 LAB
ALBUMIN SERPL-MCNC: 4.8 G/DL (ref 3.5–5.2)
ALBUMIN/GLOB SERPL: 2.2 G/DL
ALP SERPL-CCNC: 65 U/L (ref 39–117)
ALT SERPL-CCNC: 8 U/L (ref 1–33)
AST SERPL-CCNC: 14 U/L (ref 1–32)
BASOPHILS # BLD AUTO: 0.07 10*3/MM3 (ref 0–0.2)
BASOPHILS NFR BLD AUTO: 0.9 % (ref 0–1.5)
BILIRUB SERPL-MCNC: 0.3 MG/DL (ref 0–1.2)
BUN SERPL-MCNC: 9 MG/DL (ref 6–20)
BUN/CREAT SERPL: 11.8 (ref 7–25)
CALCIUM SERPL-MCNC: 9.8 MG/DL (ref 8.6–10.5)
CHLORIDE SERPL-SCNC: 106 MMOL/L (ref 98–107)
CHOLEST SERPL-MCNC: 198 MG/DL (ref 0–200)
CO2 SERPL-SCNC: 23.8 MMOL/L (ref 22–29)
CREAT SERPL-MCNC: 0.76 MG/DL (ref 0.57–1)
EOSINOPHIL # BLD AUTO: 0.25 10*3/MM3 (ref 0–0.4)
EOSINOPHIL NFR BLD AUTO: 3.1 % (ref 0.3–6.2)
ERYTHROCYTE [DISTWIDTH] IN BLOOD BY AUTOMATED COUNT: 13.1 % (ref 12.3–15.4)
GLOBULIN SER CALC-MCNC: 2.2 GM/DL
GLUCOSE SERPL-MCNC: 86 MG/DL (ref 65–99)
HCT VFR BLD AUTO: 44.2 % (ref 34–46.6)
HDLC SERPL-MCNC: 61 MG/DL (ref 40–60)
HGB BLD-MCNC: 14.2 G/DL (ref 12–15.9)
IMM GRANULOCYTES # BLD AUTO: 0.03 10*3/MM3 (ref 0–0.05)
IMM GRANULOCYTES NFR BLD AUTO: 0.4 % (ref 0–0.5)
LDLC SERPL CALC-MCNC: 122 MG/DL (ref 0–100)
LYMPHOCYTES # BLD AUTO: 1.37 10*3/MM3 (ref 0.7–3.1)
LYMPHOCYTES NFR BLD AUTO: 16.8 % (ref 19.6–45.3)
MCH RBC QN AUTO: 31.2 PG (ref 26.6–33)
MCHC RBC AUTO-ENTMCNC: 32.1 G/DL (ref 31.5–35.7)
MCV RBC AUTO: 97.1 FL (ref 79–97)
MONOCYTES # BLD AUTO: 0.34 10*3/MM3 (ref 0.1–0.9)
MONOCYTES NFR BLD AUTO: 4.2 % (ref 5–12)
NEUTROPHILS # BLD AUTO: 6.1 10*3/MM3 (ref 1.7–7)
NEUTROPHILS NFR BLD AUTO: 74.6 % (ref 42.7–76)
NRBC BLD AUTO-RTO: 0 /100 WBC (ref 0–0.2)
PLATELET # BLD AUTO: 229 10*3/MM3 (ref 140–450)
POTASSIUM SERPL-SCNC: 4.6 MMOL/L (ref 3.5–5.2)
PROT SERPL-MCNC: 7 G/DL (ref 6–8.5)
RBC # BLD AUTO: 4.55 10*6/MM3 (ref 3.77–5.28)
SODIUM SERPL-SCNC: 144 MMOL/L (ref 136–145)
T4 FREE SERPL-MCNC: 1.18 NG/DL (ref 0.93–1.7)
TRIGL SERPL-MCNC: 82 MG/DL (ref 0–150)
TSH SERPL DL<=0.005 MIU/L-ACNC: 1.77 UIU/ML (ref 0.27–4.2)
VLDLC SERPL CALC-MCNC: 15 MG/DL (ref 5–40)
WBC # BLD AUTO: 8.16 10*3/MM3 (ref 3.4–10.8)

## 2022-01-31 ENCOUNTER — PRE-PROCEDURE SCREENING (OUTPATIENT)
Dept: GASTROENTEROLOGY | Facility: CLINIC | Age: 52
End: 2022-01-31

## 2022-01-31 NOTE — TELEPHONE ENCOUNTER
Colonoscopy screening has been sent to  for review        Pt verbalized and understood that it would be few weeks before been schedule

## 2022-02-20 ENCOUNTER — PREP FOR SURGERY (OUTPATIENT)
Dept: OTHER | Facility: HOSPITAL | Age: 52
End: 2022-02-20

## 2022-02-20 DIAGNOSIS — K63.5 COLON POLYP: Primary | ICD-10-CM

## 2022-03-23 PROBLEM — K63.5 COLON POLYP: Status: ACTIVE | Noted: 2022-03-23

## 2022-07-11 ENCOUNTER — TELEPHONE (OUTPATIENT)
Dept: GASTROENTEROLOGY | Facility: CLINIC | Age: 52
End: 2022-07-11

## 2022-07-11 NOTE — TELEPHONE ENCOUNTER
Caller: Edna Moreno    Relationship to patient: Self    Best call back number: [phone]      Patient is needing: PT NEEDS TO SPEAK WITH SOMEONE ABOUT HER COLONOSCOPY APPT

## 2022-09-12 RX ORDER — LANSOPRAZOLE 30 MG/1
CAPSULE, DELAYED RELEASE ORAL
Qty: 90 CAPSULE | Refills: 3 | Status: SHIPPED | OUTPATIENT
Start: 2022-09-12 | End: 2023-03-27 | Stop reason: SDUPTHER

## 2022-09-22 ENCOUNTER — HOSPITAL ENCOUNTER (OUTPATIENT)
Facility: HOSPITAL | Age: 52
Setting detail: HOSPITAL OUTPATIENT SURGERY
Discharge: HOME | End: 2022-09-22
Attending: INTERNAL MEDICINE | Admitting: INTERNAL MEDICINE

## 2022-09-22 ENCOUNTER — ANESTHESIA EVENT (OUTPATIENT)
Dept: GASTROENTEROLOGY | Facility: HOSPITAL | Age: 52
End: 2022-09-22

## 2022-09-22 ENCOUNTER — ANESTHESIA (OUTPATIENT)
Dept: GASTROENTEROLOGY | Facility: HOSPITAL | Age: 52
End: 2022-09-22

## 2022-09-22 VITALS
BODY MASS INDEX: 24.24 KG/M2 | HEART RATE: 66 BPM | WEIGHT: 142 LBS | HEIGHT: 64 IN | DIASTOLIC BLOOD PRESSURE: 90 MMHG | RESPIRATION RATE: 17 BRPM | SYSTOLIC BLOOD PRESSURE: 140 MMHG | TEMPERATURE: 98 F | OXYGEN SATURATION: 99 %

## 2022-09-22 DIAGNOSIS — K63.5 COLON POLYP: ICD-10-CM

## 2022-09-22 PROCEDURE — 45380 COLONOSCOPY AND BIOPSY: CPT | Performed by: INTERNAL MEDICINE

## 2022-09-22 PROCEDURE — 88305 TISSUE EXAM BY PATHOLOGIST: CPT | Performed by: INTERNAL MEDICINE

## 2022-09-22 PROCEDURE — 25010000002 PROPOFOL 10 MG/ML EMULSION: Performed by: NURSE ANESTHETIST, CERTIFIED REGISTERED

## 2022-09-22 RX ORDER — LIDOCAINE HYDROCHLORIDE 20 MG/ML
INJECTION, SOLUTION INFILTRATION; PERINEURAL AS NEEDED
Status: DISCONTINUED | OUTPATIENT
Start: 2022-09-22 | End: 2022-09-22 | Stop reason: SURG

## 2022-09-22 RX ORDER — PROPOFOL 10 MG/ML
VIAL (ML) INTRAVENOUS CONTINUOUS PRN
Status: DISCONTINUED | OUTPATIENT
Start: 2022-09-22 | End: 2022-09-22 | Stop reason: SURG

## 2022-09-22 RX ORDER — SODIUM CHLORIDE, SODIUM LACTATE, POTASSIUM CHLORIDE, CALCIUM CHLORIDE 600; 310; 30; 20 MG/100ML; MG/100ML; MG/100ML; MG/100ML
30 INJECTION, SOLUTION INTRAVENOUS CONTINUOUS PRN
Status: DISCONTINUED | OUTPATIENT
Start: 2022-09-22 | End: 2022-09-22 | Stop reason: HOSPADM

## 2022-09-22 RX ORDER — PROPOFOL 10 MG/ML
VIAL (ML) INTRAVENOUS AS NEEDED
Status: DISCONTINUED | OUTPATIENT
Start: 2022-09-22 | End: 2022-09-22 | Stop reason: SURG

## 2022-09-22 RX ORDER — SODIUM CHLORIDE 0.9 % (FLUSH) 0.9 %
10 SYRINGE (ML) INJECTION EVERY 12 HOURS SCHEDULED
Status: DISCONTINUED | OUTPATIENT
Start: 2022-09-22 | End: 2022-09-22 | Stop reason: HOSPADM

## 2022-09-22 RX ORDER — SODIUM CHLORIDE 0.9 % (FLUSH) 0.9 %
10 SYRINGE (ML) INJECTION AS NEEDED
Status: DISCONTINUED | OUTPATIENT
Start: 2022-09-22 | End: 2022-09-22 | Stop reason: HOSPADM

## 2022-09-22 RX ADMIN — PROPOFOL 50 MG: 10 INJECTION, EMULSION INTRAVENOUS at 14:41

## 2022-09-22 RX ADMIN — PROPOFOL 150 MG: 10 INJECTION, EMULSION INTRAVENOUS at 14:34

## 2022-09-22 RX ADMIN — SODIUM CHLORIDE, POTASSIUM CHLORIDE, SODIUM LACTATE AND CALCIUM CHLORIDE 30 ML/HR: 600; 310; 30; 20 INJECTION, SOLUTION INTRAVENOUS at 13:24

## 2022-09-22 RX ADMIN — Medication 140 MCG/KG/MIN: at 14:34

## 2022-09-22 RX ADMIN — LIDOCAINE HYDROCHLORIDE 60 MG: 20 INJECTION, SOLUTION INFILTRATION; PERINEURAL at 14:34

## 2022-09-22 NOTE — ANESTHESIA POSTPROCEDURE EVALUATION
"Patient: Edna Moreno    Procedure Summary     Date: 09/22/22 Room / Location: [facility] ENDOSCOPY 1 /  [facility]    Anesthesia Start: 1420 Anesthesia Stop: 1456    Procedure: COLONOSCOPY to cecum and TI with cold biopsy polypectomies (N/A ) Diagnosis:       Colon polyp      (Colon polyp [K63.5])    Surgeons: Enrico Traore MD Provider: Radha Flores MD    Anesthesia Type: MAC ASA Status: 2          Anesthesia Type: MAC    Vitals  Vitals Value Taken Time   /90 09/22/22 1515   Temp     Pulse 66 09/22/22 1515   Resp 17 09/22/22 1515   SpO2 99 % 09/22/22 1515           Post Anesthesia Care and Evaluation    Patient location during evaluation: PHASE II  Patient participation: complete - patient participated  Level of consciousness: awake and alert  Pain management: adequate    Airway patency: patent  Anesthetic complications: No anesthetic complications    Cardiovascular status: acceptable  Respiratory status: acceptable  Hydration status: acceptable    Comments: /90 (BP Location: Left arm, Patient Position: Sitting)   Pulse 66   Temp 36.7 °C (98 °F) (Oral)   Resp 17   Ht 162.6 cm (64\")   Wt 64.4 kg (142 lb)   SpO2 99%   BMI 24.37 kg/m²         "

## 2022-09-22 NOTE — DISCHARGE INSTRUCTIONS
For the next 24 hours patient needs to be with a responsible adult.    For 24 hours DO NOT drive, operate machinery, appliances, drink alcohol, make important decisions or sign legal documents.    Start with a light or bland diet if you are feeling sick to your stomach otherwise advance to regular diet as tolerated.    Follow recommendations on procedure report if provided by your doctor.    Call Dr Traore for problems [phone]    Problems may include but not limited to: large amounts of bleeding, trouble breathing, repeated vomiting, severe unrelieved pain, fever or chills.

## 2022-09-22 NOTE — ANESTHESIA PREPROCEDURE EVALUATION
Anesthesia Evaluation     no history of anesthetic complications:               Airway   Mallampati: II  TM distance: >3 FB  Neck ROM: full  Small opening  Dental      Comment: Caps on front teeth    Pulmonary    (-) shortness of breath, recent URI, not a smoker  Cardiovascular     (+) dysrhythmias PVC, hyperlipidemia,   (-) hypertension, valvular problems/murmurs, CHF      Neuro/Psych  GI/Hepatic/Renal/Endo    (+)  GERD,    (-) liver disease, no renal disease, diabetes    Musculoskeletal     Abdominal    Substance History      OB/GYN          Other                      Anesthesia Plan    ASA 2     MAC     intravenous induction     Anesthetic plan, risks, benefits, and alternatives have been provided, discussed and informed consent has been obtained with: patient.        CODE STATUS:

## 2022-09-23 LAB
LAB AP CASE REPORT: NORMAL
PATH REPORT.FINAL DX SPEC: NORMAL
PATH REPORT.GROSS SPEC: NORMAL

## 2022-09-25 NOTE — PROGRESS NOTES
09/25/22       Tell her that the colon polyps that were removed were not cancerous but at least one was precancerous. I would recommend a repeat colonoscopy in 5 yrs. Send a copy of this report to her PCP.   Ildefonso kang

## 2022-09-26 NOTE — H&P
"[facility]  Pre Procedure History & Physical    Chief Complaint:   Time for my colonoscopy    Subjective     HPI:   52 y.o. female with a personal history of colonic polyps. Her last colonoscopy was in 4/19.      Past Medical History:   Past Medical History:   Diagnosis Date   • GERD (gastroesophageal reflux disease)    • Hiatal hernia    • PVC (premature ventricular contraction)        Family History:  Family History   Problem Relation Age of Onset   • Malig Hyperthermia Neg Hx        Social History:   reports that she has never smoked. She has never used smokeless tobacco. She reports current alcohol use. She reports that she does not use drugs.    Medications:   No medications prior to admission.       Allergies:  Hydrocodone    ROS:    Pertinent items are noted in HPI     Objective     Blood pressure 140/90, pulse 66, temperature 98 °F (36.7 °C), temperature source Oral, resp. rate 17, height 162.6 cm (64\"), weight 64.4 kg (142 lb), SpO2 99 %, not currently breastfeeding.    Physical Exam   Constitutional: Pt is oriented to person, place, and time and well-developed, well-nourished, and in no distress.   HENT:   Mouth/Throat: Oropharynx is clear and moist.   Neck: Normal range of motion. Neck supple.   Cardiovascular: Normal rate, regular rhythm and normal heart sounds.    Pulmonary/Chest: Effort normal and breath sounds normal. No respiratory distress. No  wheezes.   Abdominal: Soft. Bowel sounds are normal.   Skin: Skin is warm and dry.   Psychiatric: Mood, memory, affect and judgment normal.     Assessment & Plan     Diagnosis:  52 y.o. female with a personal history of colonic polyps. Her last colonoscopy was in 4/19.    Anticipated Surgical Procedure:  Colonoscopy    The risks, benefits, and alternatives of this procedure have been discussed with the patient or the responsible party- the patient understands and agrees to proceed.    Enrico Traore M.D.  "

## 2022-10-07 ENCOUNTER — TELEPHONE (OUTPATIENT)
Dept: GASTROENTEROLOGY | Facility: CLINIC | Age: 52
End: 2022-10-07

## 2022-10-07 NOTE — TELEPHONE ENCOUNTER
Caller: Edna Moreno    Relationship: Self    Best call back number: [phone]    What is the best time to reach you: ANY    Who are you requesting to speak with (clinical staff, provider,  specific staff member): CLINICAL STAFF    Do you know the name of the person who called:     What was the call regarding: PT WOULD LIKE TO KNOW RESULTS OF COLONOSCOPY SHE HAD ON 09/22/22    Do you require a callback: YES

## 2022-10-07 NOTE — TELEPHONE ENCOUNTER
Called pt and advised of Dr Traore' s note. Verb understanding.     C/s placed in recall and hmfor 09/22/2027.    Results sent to Dr Montano thru [facility].

## 2022-10-07 NOTE — TELEPHONE ENCOUNTER
----- Message from Enrico Traore MD sent at 9/25/2022  4:03 PM EDT -----  09/25/22       Tell her that the colon polyps that were removed were not cancerous but at least one was precancerous. I would recommend a repeat colonoscopy in 5 yrs. Send a copy of this report to her PCP.   Thx. kjh

## 2023-03-27 ENCOUNTER — OFFICE VISIT (OUTPATIENT)
Dept: FAMILY MEDICINE CLINIC | Facility: CLINIC | Age: 53
End: 2023-03-27
Payer: COMMERCIAL

## 2023-03-27 VITALS
DIASTOLIC BLOOD PRESSURE: 76 MMHG | RESPIRATION RATE: 15 BRPM | OXYGEN SATURATION: 96 % | WEIGHT: 151.8 LBS | HEIGHT: 64 IN | HEART RATE: 76 BPM | BODY MASS INDEX: 25.92 KG/M2 | SYSTOLIC BLOOD PRESSURE: 120 MMHG

## 2023-03-27 DIAGNOSIS — Z78.0 ENCOUNTER FOR OSTEOPOROSIS SCREENING IN ASYMPTOMATIC POSTMENOPAUSAL PATIENT: ICD-10-CM

## 2023-03-27 DIAGNOSIS — Z13.820 ENCOUNTER FOR OSTEOPOROSIS SCREENING IN ASYMPTOMATIC POSTMENOPAUSAL PATIENT: ICD-10-CM

## 2023-03-27 DIAGNOSIS — Z00.00 ANNUAL PHYSICAL EXAM: Primary | ICD-10-CM

## 2023-03-27 PROCEDURE — 99396 PREV VISIT EST AGE 40-64: CPT | Performed by: INTERNAL MEDICINE

## 2023-03-27 RX ORDER — LANSOPRAZOLE 30 MG/1
30 CAPSULE, DELAYED RELEASE ORAL DAILY
Qty: 90 CAPSULE | Refills: 3 | Status: SHIPPED | OUTPATIENT
Start: 2023-03-27

## 2023-03-27 NOTE — PROGRESS NOTES
Subjective Chief complaint is annual exam  Edna Moreno is a 52 y.o. female.     History of Present Illness Edna is here today for her annual exam.  She seems to be feeling well most of the time.  She has had some increased weight.  She has not been exercising quite as much as usual.  Her blood pressure seems to be doing okay today.  She is not having any new problems.  She does have some occasional headaches.  They are usually right-sided.  She has also been experiencing some shoulder pain.  We did look under her health maintenance tab.  She had a recent colonoscopy.  Her mammograms are up-to-date.  She has not had any type of bone density test.  There is a family history of some osteoporosis and osteopenia.  The following portions of the patient's history were reviewed and updated as appropriate: allergies, current medications, past family history, past medical history, past social history, past surgical history and problem list.    Review of Systems   Constitutional: Negative for chills and fever.   HENT: Negative for hearing loss, nosebleeds, sore throat, tinnitus and trouble swallowing.    Eyes: Negative for blurred vision, double vision and visual disturbance.   Respiratory: Negative for chest tightness and shortness of breath.    Cardiovascular: Negative for chest pain, palpitations and leg swelling.   Gastrointestinal: Negative for abdominal pain, blood in stool, constipation, diarrhea, GERD and indigestion.   Endocrine: Negative for polydipsia and polyuria.   Genitourinary: Negative for dysuria, hematuria, vaginal bleeding and vaginal discharge.   Musculoskeletal: Positive for arthralgias and neck pain.   Neurological: Positive for headache. Negative for dizziness, weakness, light-headedness and numbness.   Hematological: Does not bruise/bleed easily.       Objective   Physical Exam  Vitals and nursing note reviewed.   Constitutional:       Appearance: Normal appearance.   HENT:      Head:  Normocephalic and atraumatic.      Right Ear: Tympanic membrane and ear canal normal.      Left Ear: Tympanic membrane and ear canal normal.      Nose: No congestion or rhinorrhea.      Mouth/Throat:      Mouth: Mucous membranes are moist.      Pharynx: Oropharynx is clear. No oropharyngeal exudate or posterior oropharyngeal erythema.   Eyes:      General: No scleral icterus.     Conjunctiva/sclera: Conjunctivae normal.      Pupils: Pupils are equal, round, and reactive to light.   Neck:      Thyroid: No thyroid mass or thyromegaly.      Vascular: No carotid bruit.   Cardiovascular:      Rate and Rhythm: Normal rate and regular rhythm.      Pulses: Normal pulses.      Heart sounds: No murmur heard.    No friction rub. No gallop.   Pulmonary:      Effort: Pulmonary effort is normal.      Breath sounds: No wheezing, rhonchi or rales.   Abdominal:      General: Bowel sounds are normal.      Palpations: Abdomen is soft.      Tenderness: There is no abdominal tenderness. There is no guarding or rebound.   Musculoskeletal:         General: Normal range of motion.      Right lower leg: No edema.      Left lower leg: No edema.   Lymphadenopathy:      Cervical: No cervical adenopathy.   Skin:     General: Skin is warm.      Findings: No bruising, lesion or rash.   Neurological:      General: No focal deficit present.      Mental Status: She is alert.      Cranial Nerves: No cranial nerve deficit.      Coordination: Coordination normal.      Deep Tendon Reflexes: Reflexes normal.   Psychiatric:         Mood and Affect: Mood normal.           Assessment & Plan   Diagnoses and all orders for this visit:    1. Annual physical exam (Primary)  -     CBC & Differential  -     Comprehensive Metabolic Panel  -     Hemoglobin A1c  -     Lipid Panel  -     TSH+Free T4  -     Vitamin D,25-Hydroxy  -     DEXA Bone Density Axial; Future    2. Encounter for osteoporosis screening in asymptomatic postmenopausal patient  -     Vitamin  D,25-Hydroxy  -     DEXA Bone Density Axial; Future    Other orders  -     lansoprazole (PREVACID) 30 MG capsule; Take 1 capsule by mouth Daily.  Dispense: 90 capsule; Refill: 3      Edna is here today for her physical exam.  She is up to speed on most screenings.  We are going to check some appropriate labs today and we will get her a bone density test.

## 2023-03-28 LAB
25(OH)D3+25(OH)D2 SERPL-MCNC: 17 NG/ML (ref 30–100)
ALBUMIN SERPL-MCNC: 4.6 G/DL (ref 3.8–4.9)
ALBUMIN/GLOB SERPL: 1.6 {RATIO} (ref 1.2–2.2)
ALP SERPL-CCNC: 83 IU/L (ref 44–121)
ALT SERPL-CCNC: 11 IU/L (ref 0–32)
AST SERPL-CCNC: 14 IU/L (ref 0–40)
BASOPHILS # BLD AUTO: 0.1 X10E3/UL (ref 0–0.2)
BASOPHILS NFR BLD AUTO: 1 %
BILIRUB SERPL-MCNC: 0.4 MG/DL (ref 0–1.2)
BUN SERPL-MCNC: 14 MG/DL (ref 6–24)
BUN/CREAT SERPL: 17 (ref 9–23)
CALCIUM SERPL-MCNC: 9.5 MG/DL (ref 8.7–10.2)
CHLORIDE SERPL-SCNC: 106 MMOL/L (ref 96–106)
CHOLEST SERPL-MCNC: 205 MG/DL (ref 100–199)
CO2 SERPL-SCNC: 21 MMOL/L (ref 20–29)
CREAT SERPL-MCNC: 0.84 MG/DL (ref 0.57–1)
EGFRCR SERPLBLD CKD-EPI 2021: 83 ML/MIN/1.73
EOSINOPHIL # BLD AUTO: 0.3 X10E3/UL (ref 0–0.4)
EOSINOPHIL NFR BLD AUTO: 5 %
ERYTHROCYTE [DISTWIDTH] IN BLOOD BY AUTOMATED COUNT: 12.8 % (ref 11.7–15.4)
GLOBULIN SER CALC-MCNC: 2.8 G/DL (ref 1.5–4.5)
GLUCOSE SERPL-MCNC: 91 MG/DL (ref 70–99)
HBA1C MFR BLD: 5.6 % (ref 4.8–5.6)
HCT VFR BLD AUTO: 42.8 % (ref 34–46.6)
HDLC SERPL-MCNC: 63 MG/DL
HGB BLD-MCNC: 13.7 G/DL (ref 11.1–15.9)
IMM GRANULOCYTES # BLD AUTO: 0 X10E3/UL (ref 0–0.1)
IMM GRANULOCYTES NFR BLD AUTO: 0 %
LDLC SERPL CALC-MCNC: 131 MG/DL (ref 0–99)
LYMPHOCYTES # BLD AUTO: 1.5 X10E3/UL (ref 0.7–3.1)
LYMPHOCYTES NFR BLD AUTO: 25 %
MCH RBC QN AUTO: 30.1 PG (ref 26.6–33)
MCHC RBC AUTO-ENTMCNC: 32 G/DL (ref 31.5–35.7)
MCV RBC AUTO: 94 FL (ref 79–97)
MONOCYTES # BLD AUTO: 0.3 X10E3/UL (ref 0.1–0.9)
MONOCYTES NFR BLD AUTO: 6 %
NEUTROPHILS # BLD AUTO: 3.7 X10E3/UL (ref 1.4–7)
NEUTROPHILS NFR BLD AUTO: 63 %
PLATELET # BLD AUTO: 223 X10E3/UL (ref 150–450)
POTASSIUM SERPL-SCNC: 4.2 MMOL/L (ref 3.5–5.2)
PROT SERPL-MCNC: 7.4 G/DL (ref 6–8.5)
RBC # BLD AUTO: 4.55 X10E6/UL (ref 3.77–5.28)
SODIUM SERPL-SCNC: 142 MMOL/L (ref 134–144)
T4 FREE SERPL-MCNC: 1.27 NG/DL (ref 0.82–1.77)
TRIGL SERPL-MCNC: 61 MG/DL (ref 0–149)
TSH SERPL DL<=0.005 MIU/L-ACNC: 1.59 UIU/ML (ref 0.45–4.5)
VLDLC SERPL CALC-MCNC: 11 MG/DL (ref 5–40)
WBC # BLD AUTO: 5.8 X10E3/UL (ref 3.4–10.8)

## 2023-04-05 ENCOUNTER — TELEPHONE (OUTPATIENT)
Dept: FAMILY MEDICINE CLINIC | Facility: CLINIC | Age: 53
End: 2023-04-05
Payer: COMMERCIAL

## 2023-04-05 NOTE — TELEPHONE ENCOUNTER
Caller: Edna Moreno    Relationship: Self    Best call back number: [phone]    What is the best time to reach you: ANY    Who are you requesting to speak with (clinical staff, provider,  specific staff member): DR. KIRBY    Do you know the name of the person who called:     What was the call regarding: HER TEST RESULTS. SHE HAS SOME QUESTIONS  THE HEMOGLOBIN A1C AND THE CHLORIDE.   Do you require a callback: YES    I advised that she needs to watch her carbohydrates closely.  Advised the chloride is really not anything to worry about

## 2023-05-17 ENCOUNTER — HOSPITAL ENCOUNTER (OUTPATIENT)
Dept: BONE DENSITY | Facility: HOSPITAL | Age: 53
Discharge: HOME | End: 2023-05-17
Admitting: INTERNAL MEDICINE
Payer: COMMERCIAL

## 2023-05-17 DIAGNOSIS — Z13.820 ENCOUNTER FOR OSTEOPOROSIS SCREENING IN ASYMPTOMATIC POSTMENOPAUSAL PATIENT: ICD-10-CM

## 2023-05-17 DIAGNOSIS — Z78.0 ENCOUNTER FOR OSTEOPOROSIS SCREENING IN ASYMPTOMATIC POSTMENOPAUSAL PATIENT: ICD-10-CM

## 2023-05-17 DIAGNOSIS — Z00.00 ANNUAL PHYSICAL EXAM: ICD-10-CM

## 2023-05-17 PROCEDURE — 77080 DXA BONE DENSITY AXIAL: CPT

## 2023-05-19 ENCOUNTER — TELEPHONE (OUTPATIENT)
Dept: FAMILY MEDICINE CLINIC | Facility: CLINIC | Age: 53
End: 2023-05-19

## 2023-05-19 NOTE — TELEPHONE ENCOUNTER
Caller: Edna Moreno    Relationship: Self    Best call back number: [phone]    What was the call regarding: PATIENT STATED DR KIRBY CALLED HER YESTERDAY WITH BONE DENSITY RESULTS.    SHE STATED THE REPORT STATED SHE HAS OSTEOPOROSIS, BUT DR KIRBY THOUGHT MAYBE THEY MEANT TO PUT OSTEOPENIA.    SHE WOULD LIKE TO KNOW IF DR KIRBY COULD CONTACT WHOMEVER READ AND REVISED THESE RESULTS, AND HAVE THEM AMEND THIS IF IT IS NOT CORRECT.    SHE WOULD LIKE A CALL REGARDING THIS TO LET HER KNOW FOR SURE OF THE RESULTS.    PLEASE CALL.    Do you require a callback: YES

## 2023-11-17 NOTE — PROGRESS NOTES
New Shoulder      Patient: Edna Moreno        YOB: 1970    Medical Record Number: [id number]        Chief Complaints: Left shoulder pain      History of Present Illness: This is a 53-year-old female who presents complaining of left shoulder pain she is right-hand dominant she states it has been intermittent for years but over the summer noticed a worsening of her symptoms.  She did see Dr. Lino Flores in August who thought she had more impingement and injected her she states injection made it worse for several weeks and did not help it.  She did not get to physical therapy.  Symptoms are moderate intermittent aching worse with activity somewhat better with rest past medical history as listed below      Allergies:   Allergies   Allergen Reactions    Hydrocodone Nausea Only       Medications:   Home Medications:  Current Outpatient Medications on File Prior to Visit   Medication Sig    lansoprazole (PREVACID) 30 MG capsule Take 1 capsule by mouth Daily.    norethindrone-ethinyl estradiol (FEMHRT LOW DOSE) 0.5-2.5 MG-MCG per tablet Take 1 tablet by mouth Daily.     No current facility-administered medications on file prior to visit.     Current Medications:  Scheduled Meds:  Continuous Infusions:No current facility-administered medications for this visit.    PRN Meds:.    Past Medical History:   Diagnosis Date    GERD (gastroesophageal reflux disease)     Hiatal hernia     PVC (premature ventricular contraction)     Tennis elbow         Past Surgical History:   Procedure Laterality Date    COLONOSCOPY N/A 04/26/2019    Procedure: COLONOSCOPY into cecum with cold snare polypectomies x4, one unretrieved polyp from ascending colon;  Surgeon: Devang Omer MD;  Location: [facility] ENDOSCOPY;  Service: Gastroenterology    COLONOSCOPY N/A 09/22/2022    Procedure: COLONOSCOPY to cecum and TI with cold biopsy polypectomies;  Surgeon: Enrico Troare MD;  Location: [facility] ENDOSCOPY;  Service:  "Gastroenterology;  Laterality: N/A;  pre: h/o colon polyps  post: polyps    ENDOSCOPY      X2    WRIST SURGERY Left         Social History     Occupational History    Not on file   Tobacco Use    Smoking status: Never    Smokeless tobacco: Never   Vaping Use    Vaping Use: Never used   Substance and Sexual Activity    Alcohol use: Yes     Comment: Occasional weekend    Drug use: Never    Sexual activity: Not Currently      Social History     Social History Narrative    Not on file        Family History   Problem Relation Age of Onset    Osteoporosis Mother     Cancer Paternal Aunt     Malig Hyperthermia Neg Hx              Review of Systems:     Review of Systems      Physical Exam: 53 y.o. female  General Appearance:    Alert, cooperative, in no acute distress                   Vitals:    11/21/23 1523   Temp: 98 °F (36.7 °C)   Weight: 68.8 kg (151 lb 9.6 oz)   Height: 162.6 cm (64\")   PainSc:   7      Patient is alert and read ×3 no acute distress appears her above-listed at height weight and age.  Affect is normal respiratory rate is normal unlabored. Heart rate regular rate rhythm, sclera, dentition and hearing are normal for the purpose of this exam.    Ortho Exam  Physical exam of the left shoulder reveals no overlying skin changes no lymphedema no lymphadenopathy.  Patient has active flexion 165 with mild symptoms abduction is similar external rotation is to 50 and internal rotation to the lower lumbar spine with mild symptoms.  Patient has good rotator cuff strength 4+ over 5 with isometric strength testing with pain.  Patient has a positive impingement and a positive Chávez sign.  Patient has good cervical range of motion which is full and asymptomatic no radicular symptoms.  Patient has a normal elbow exam.  Good distal pulses are presentPatient has pain with overhead activity and a positive Neer sign and a positive empty can sign  They have a positive drop arm any definitive painful arc   Large Joint " Arthrocentesis: L glenohumeral  Date/Time: 11/21/2023 3:42 PM  Consent given by: patient  Site marked: site marked  Timeout: Immediately prior to procedure a time out was called to verify the correct patient, procedure, equipment, support staff and site/side marked as required   Supporting Documentation  Indications: pain   Procedure Details  Location: shoulder - L glenohumeral  Preparation: Patient was prepped and draped in the usual sterile fashion  Needle gauge: 21G.  Approach: posterior  Medications administered: 1 mL methylPREDNISolone acetate 80 MG/ML; 2 mL lidocaine PF 1% 1 %  Patient tolerance: patient tolerated the procedure well with no immediate complications              Radiology:   AP, Scapular Y and Axillary Lateral of the left shoulder were /reviewed to evauate shoulder pain.  She did bring trays with her from August these look fine she has some narrowing of her acromioclavicular joint otherwise no acute bony pathology  Imaging Results (Most Recent)       None          Assessment/Plan: Left shoulder pain I really think this is an early capsulitis I think that accounts for the persistence of her symptoms plan is to proceed with an injection as a diagnostic and therapeutic tool I will start on the meloxicam with strict precautions for short period of time and have her see physical therapy.  We did discuss the natural history of right of frozen shoulder and the pathophysiology associated with it also talked about the need for manipulation which I think will be unlikely also the need possible need for other means of testing should her symptoms remain despite return of her range of motion  Cortisone Injection. See procedure note.  Cortisone Injection for DIAGNOSTIC and THERAPUTIC purposes.  Prescription medication given with instructions, warnings, and precautions.

## 2023-11-21 ENCOUNTER — OFFICE VISIT (OUTPATIENT)
Dept: ORTHOPEDIC SURGERY | Facility: CLINIC | Age: 53
End: 2023-11-21
Payer: COMMERCIAL

## 2023-11-21 VITALS — TEMPERATURE: 98 F | HEIGHT: 64 IN | BODY MASS INDEX: 25.88 KG/M2 | WEIGHT: 151.6 LBS

## 2023-11-21 DIAGNOSIS — M75.02 ADHESIVE CAPSULITIS OF LEFT SHOULDER: Primary | ICD-10-CM

## 2023-11-21 RX ORDER — MELOXICAM 15 MG/1
TABLET ORAL
Qty: 30 TABLET | Refills: 0 | Status: SHIPPED | OUTPATIENT
Start: 2023-11-21

## 2023-11-21 RX ADMIN — LIDOCAINE HYDROCHLORIDE 2 ML: 10 INJECTION, SOLUTION EPIDURAL; INFILTRATION; INTRACAUDAL; PERINEURAL at 15:42

## 2023-11-21 RX ADMIN — METHYLPREDNISOLONE ACETATE 1 ML: 80 INJECTION, SUSPENSION INTRA-ARTICULAR; INTRALESIONAL; INTRAMUSCULAR; SOFT TISSUE at 15:42

## 2023-11-22 RX ORDER — METHYLPREDNISOLONE ACETATE 80 MG/ML
1 INJECTION, SUSPENSION INTRA-ARTICULAR; INTRALESIONAL; INTRAMUSCULAR; SOFT TISSUE
Status: COMPLETED | OUTPATIENT
Start: 2023-11-21 | End: 2023-11-21

## 2023-11-22 RX ORDER — LIDOCAINE HYDROCHLORIDE 10 MG/ML
2 INJECTION, SOLUTION EPIDURAL; INFILTRATION; INTRACAUDAL; PERINEURAL
Status: COMPLETED | OUTPATIENT
Start: 2023-11-21 | End: 2023-11-21

## 2023-12-18 RX ORDER — MELOXICAM 15 MG/1
TABLET ORAL
Qty: 30 TABLET | Refills: 0 | Status: SHIPPED | OUTPATIENT
Start: 2023-12-18

## 2023-12-26 NOTE — PROGRESS NOTES
Patient: Edna Moreno  YOB: 1970  Date of Service: 12/26/2023    Chief Complaints: Left shoulder pain    Subjective:    History of Present Illness: Pt is seen in the office today with complaints of left shoulder pain I last saw her and thought she had an element of capsulitis I did a glenohumeral injection physical therapy she states she is 80% better than where she was overall very happy        Allergies:   Allergies   Allergen Reactions    Hydrocodone Nausea Only       Medications:   Home Medications:  Current Outpatient Medications on File Prior to Visit   Medication Sig    lansoprazole (PREVACID) 30 MG capsule Take 1 capsule by mouth Daily.    meloxicam (MOBIC) 15 MG tablet TAKE 1 TABLET BY MOUTH EVERY DAY WITH FOOD    norethindrone-ethinyl estradiol (FEMHRT LOW DOSE) 0.5-2.5 MG-MCG per tablet Take 1 tablet by mouth Daily.     No current facility-administered medications on file prior to visit.     Current Medications:  Scheduled Meds:  Continuous Infusions:No current facility-administered medications for this visit.    PRN Meds:.    I have reviewed the patient's medical history in detail and updated the computerized patient record.  Review and summarization of old records include:    Past Medical History:   Diagnosis Date    GERD (gastroesophageal reflux disease)     Hiatal hernia     PVC (premature ventricular contraction)     Tennis elbow         Past Surgical History:   Procedure Laterality Date    COLONOSCOPY N/A 04/26/2019    Procedure: COLONOSCOPY into cecum with cold snare polypectomies x4, one unretrieved polyp from ascending colon;  Surgeon: Devang Omer MD;  Location: [facility] ENDOSCOPY;  Service: Gastroenterology    COLONOSCOPY N/A 09/22/2022    Procedure: COLONOSCOPY to cecum and TI with cold biopsy polypectomies;  Surgeon: Enrico Traore MD;  Location: [facility] ENDOSCOPY;  Service: Gastroenterology;  Laterality: N/A;  pre: h/o colon polyps  post: polyps    ENDOSCOPY      X2     WRIST SURGERY Left         Social History     Occupational History    Not on file   Tobacco Use    Smoking status: Never    Smokeless tobacco: Never   Vaping Use    Vaping Use: Never used   Substance and Sexual Activity    Alcohol use: Yes     Comment: Occasional weekend    Drug use: Never    Sexual activity: Not Currently      Social History     Social History Narrative    Not on file        Family History   Problem Relation Age of Onset    Osteoporosis Mother     Cancer Paternal Aunt     Malig Hyperthermia Neg Hx        ROS: 14 point review of systems was performed and was negative except for documented findings in HPI and today's encounter.     Allergies:   Allergies   Allergen Reactions    Hydrocodone Nausea Only     Constitutional:  Denies fever, shaking or chills   Eyes:  Denies change in visual acuity   HENT:  Denies nasal congestion or sore throat   Respiratory:  Denies cough or shortness of breath   Cardiovascular:  Denies chest pain or severe LE edema   GI:  Denies abdominal pain, nausea, vomiting, bloody stools or diarrhea   Musculoskeletal:  Numbness, tingling, or loss of motor function only as noted above in history of present illness.  : Denies painful urination or hematuria  Integument:  Denies rash, lesion or ulceration   Neurologic:  Denies headache or focal weakness  Endocrine:  Denies lymphadenopathy  Psych:  Denies confusion or change in mental status   Hem:  Denies active bleeding      Physical Exam: 53 y.o. female  Wt Readings from Last 3 Encounters:   11/21/23 68.8 kg (151 lb 9.6 oz)   03/27/23 68.9 kg (151 lb 12.8 oz)   09/22/22 64.4 kg (142 lb)       There is no height or weight on file to calculate BMI.    There were no vitals filed for this visit.  Vital signs reviewed.   General Appearance:    Alert, cooperative, in no acute distress                    Ortho exam        Physical exam of the left shoulder reveals no overlying skin changes no lymphedema no lymphadenopathy.  Patient has  active flexion 180 with mild symptoms abduction is similar external rotation is to 50 and internal rotation to the upper lumbar spine with mild symptoms.  Patient has good rotator cuff strength 4+ over 5 with isometric strength testing with pain.  Patient has a positive impingement and a positive Chávez sign.  Patient has good cervical range of motion which is full and asymptomatic no radicular symptoms.  Patient has a normal elbow exam.  Good distal pulses are presentPatient has pain with overhead activity and a positive Neer sign and a positive empty can sign  They have a positive drop arm any definitive painful arc          Assessment: Left shoulder pain I think her diagnosis of capsulitis was correct I think she should continue to improve with continued physical therapy should she plateau or worsen my neck step would be an MRI.  If it is more than 3 months I might even consider 1 more glenohumeral injection    Plan: Is as above  Follow up as indicated.  Ice, elevate, and rest as needed.  Discussed conservative measures of pain control including ice, bracing.  Also talked about the importance of strengthening and range of motion  Kathrine Jackson M.D.

## 2023-12-28 ENCOUNTER — OFFICE VISIT (OUTPATIENT)
Dept: ORTHOPEDIC SURGERY | Facility: CLINIC | Age: 53
End: 2023-12-28
Payer: COMMERCIAL

## 2023-12-28 VITALS — HEIGHT: 64 IN | WEIGHT: 151.8 LBS | BODY MASS INDEX: 25.92 KG/M2 | TEMPERATURE: 98.2 F

## 2023-12-28 DIAGNOSIS — M75.02 ADHESIVE CAPSULITIS OF LEFT SHOULDER: Primary | ICD-10-CM

## 2023-12-28 PROCEDURE — 99212 OFFICE O/P EST SF 10 MIN: CPT | Performed by: ORTHOPAEDIC SURGERY

## 2024-03-25 ENCOUNTER — OFFICE VISIT (OUTPATIENT)
Dept: FAMILY MEDICINE CLINIC | Facility: CLINIC | Age: 54
End: 2024-03-25
Payer: COMMERCIAL

## 2024-03-25 VITALS
DIASTOLIC BLOOD PRESSURE: 92 MMHG | SYSTOLIC BLOOD PRESSURE: 140 MMHG | HEIGHT: 64 IN | WEIGHT: 150 LBS | RESPIRATION RATE: 16 BRPM | OXYGEN SATURATION: 99 % | BODY MASS INDEX: 25.61 KG/M2 | HEART RATE: 83 BPM

## 2024-03-25 DIAGNOSIS — R10.13 EPIGASTRIC PAIN: Primary | ICD-10-CM

## 2024-03-25 DIAGNOSIS — R35.0 URINARY FREQUENCY: ICD-10-CM

## 2024-03-25 DIAGNOSIS — R10.11 RIGHT UPPER QUADRANT PAIN: ICD-10-CM

## 2024-03-25 DIAGNOSIS — R73.9 HYPERGLYCEMIA: ICD-10-CM

## 2024-03-25 DIAGNOSIS — E55.9 VITAMIN D DEFICIENCY: ICD-10-CM

## 2024-03-25 DIAGNOSIS — R03.0 ELEVATED BLOOD PRESSURE READING: ICD-10-CM

## 2024-03-25 DIAGNOSIS — E78.00 ELEVATED LDL CHOLESTEROL LEVEL: ICD-10-CM

## 2024-03-25 PROCEDURE — 99214 OFFICE O/P EST MOD 30 MIN: CPT | Performed by: INTERNAL MEDICINE

## 2024-03-25 RX ORDER — ALUMINUM CHLORIDE 20 %
SOLUTION, NON-ORAL TOPICAL
COMMUNITY
Start: 2024-01-09

## 2024-03-25 NOTE — PROGRESS NOTES
Subjective chief complaint possible gallbladder attacks  Edna Moreno is a 53 y.o. female.     Hyperlipidemia  Pertinent negatives include no chest pain or shortness of breath.    Edna is here today for possible gallbladder attacks.  She has had 2 significant episodes of postprandial abdominal pain.  This is in the right upper quadrant.  Sometimes she feels it in the mid epigastrium as well.  It does radiate to the back.  It is lasted for 25 or 30 minutes.  The pain was severe at times.  It did resolve on its own.  She did have a normal ultrasound of the gallbladder and CT scan of the abdomen and 2014.  She does have a known hiatal hernia with reflux.  She is on lansoprazole which typically does okay for preventing that.  She has not gained any weight or lost any weight recently.  She has had some urinary frequency.  Her last A1c was borderline at 5.6.  She does have some elevations in her LDL cholesterol.    The following portions of the patient's history were reviewed and updated as appropriate: allergies, current medications, and problem list.    Review of Systems   Respiratory:  Negative for chest tightness and shortness of breath.    Cardiovascular:  Negative for chest pain.   Gastrointestinal:  Positive for abdominal pain and indigestion.        She does experience stomach gurgling       Objective   Physical Exam  Vitals and nursing note reviewed.   Constitutional:       Appearance: Normal appearance.   Eyes:      General: No scleral icterus.  Neck:      Vascular: No carotid bruit.   Cardiovascular:      Rate and Rhythm: Normal rate and regular rhythm.      Heart sounds: No murmur heard.     No friction rub. No gallop.   Pulmonary:      Effort: Pulmonary effort is normal.      Breath sounds: No wheezing, rhonchi or rales.   Abdominal:      General: Bowel sounds are normal.      Palpations: Abdomen is soft.      Tenderness: There is no abdominal tenderness. There is no guarding or rebound.       Comments: Naranjo's was negative   Skin:     Coloration: Skin is not jaundiced.   Neurological:      Mental Status: She is alert.           Assessment & Plan   Diagnoses and all orders for this visit:    1. Epigastric pain (Primary)  -     Amylase  -     Lipase  -     US Gallbladder; Future  -     NM HIDA SCAN WITH PHARMACOLOGICAL INTERVENTION; Future    2. Right upper quadrant pain  -     CBC & Differential  -     Amylase  -     Lipase  -     US Gallbladder; Future  -     NM HIDA SCAN WITH PHARMACOLOGICAL INTERVENTION; Future    3. Urinary frequency  -     Comprehensive Metabolic Panel  -     Urinalysis With Microscopic - Urine, Clean Catch  -     Urine Culture - Urine, Urine, Clean Catch    4. Vitamin D deficiency  -     Vitamin D,25-Hydroxy    5. Elevated LDL cholesterol level  -     Comprehensive Metabolic Panel  -     Lipid Panel    6. Hyperglycemia  -     Comprehensive Metabolic Panel  -     Hemoglobin A1c    7. Elevated blood pressure reading    Edna is here today for some epigastric pain.  We are going to check some appropriate labs and also get a ultrasound of her gallbladder and HIDA scan.  She is due to have some lab work for her cholesterol and blood sugar as well as vitamin D.  Because of the urinary frequency we will check a urinalysis and culture.  Her blood pressure was elevated to my exam at 140/92.  We are going to let her try following a DASH diet and see her back in 3 months

## 2024-03-27 LAB
25(OH)D3+25(OH)D2 SERPL-MCNC: 24.9 NG/ML (ref 30–100)
ALBUMIN SERPL-MCNC: 4.8 G/DL (ref 3.5–5.2)
ALBUMIN/GLOB SERPL: 1.8 G/DL
ALP SERPL-CCNC: 99 U/L (ref 39–117)
ALT SERPL-CCNC: 12 U/L (ref 1–33)
AMYLASE SERPL-CCNC: 32 U/L (ref 28–100)
APPEARANCE UR: CLEAR
AST SERPL-CCNC: 17 U/L (ref 1–32)
BACTERIA #/AREA URNS HPF: NORMAL /HPF
BACTERIA UR CULT: NO GROWTH
BACTERIA UR CULT: NORMAL
BASOPHILS # BLD AUTO: 0.06 10*3/MM3 (ref 0–0.2)
BASOPHILS NFR BLD AUTO: 0.9 % (ref 0–1.5)
BILIRUB SERPL-MCNC: 0.4 MG/DL (ref 0–1.2)
BILIRUB UR QL STRIP: NEGATIVE
BUN SERPL-MCNC: 10 MG/DL (ref 6–20)
BUN/CREAT SERPL: 10.4 (ref 7–25)
CALCIUM SERPL-MCNC: 9.8 MG/DL (ref 8.6–10.5)
CASTS URNS MICRO: NORMAL
CHLORIDE SERPL-SCNC: 104 MMOL/L (ref 98–107)
CHOLEST SERPL-MCNC: 210 MG/DL (ref 0–200)
CO2 SERPL-SCNC: 22.2 MMOL/L (ref 22–29)
COLOR UR: YELLOW
CREAT SERPL-MCNC: 0.96 MG/DL (ref 0.57–1)
EGFRCR SERPLBLD CKD-EPI 2021: 70 ML/MIN/1.73
EOSINOPHIL # BLD AUTO: 0.27 10*3/MM3 (ref 0–0.4)
EOSINOPHIL NFR BLD AUTO: 3.9 % (ref 0.3–6.2)
EPI CELLS #/AREA URNS HPF: NORMAL /HPF
ERYTHROCYTE [DISTWIDTH] IN BLOOD BY AUTOMATED COUNT: 12.7 % (ref 12.3–15.4)
GLOBULIN SER CALC-MCNC: 2.6 GM/DL
GLUCOSE SERPL-MCNC: 93 MG/DL (ref 65–99)
GLUCOSE UR QL STRIP: NEGATIVE
HBA1C MFR BLD: 5.5 % (ref 4.8–5.6)
HCT VFR BLD AUTO: 44.4 % (ref 34–46.6)
HDLC SERPL-MCNC: 65 MG/DL (ref 40–60)
HGB BLD-MCNC: 14.6 G/DL (ref 12–15.9)
HGB UR QL STRIP: NEGATIVE
IMM GRANULOCYTES # BLD AUTO: 0.02 10*3/MM3 (ref 0–0.05)
IMM GRANULOCYTES NFR BLD AUTO: 0.3 % (ref 0–0.5)
KETONES UR QL STRIP: NEGATIVE
LDLC SERPL CALC-MCNC: 128 MG/DL (ref 0–100)
LEUKOCYTE ESTERASE UR QL STRIP: NEGATIVE
LIPASE SERPL-CCNC: 33 U/L (ref 13–60)
LYMPHOCYTES # BLD AUTO: 1.49 10*3/MM3 (ref 0.7–3.1)
LYMPHOCYTES NFR BLD AUTO: 21.5 % (ref 19.6–45.3)
MCH RBC QN AUTO: 31.3 PG (ref 26.6–33)
MCHC RBC AUTO-ENTMCNC: 32.9 G/DL (ref 31.5–35.7)
MCV RBC AUTO: 95.1 FL (ref 79–97)
MONOCYTES # BLD AUTO: 0.38 10*3/MM3 (ref 0.1–0.9)
MONOCYTES NFR BLD AUTO: 5.5 % (ref 5–12)
NEUTROPHILS # BLD AUTO: 4.72 10*3/MM3 (ref 1.7–7)
NEUTROPHILS NFR BLD AUTO: 67.9 % (ref 42.7–76)
NITRITE UR QL STRIP: NEGATIVE
NRBC BLD AUTO-RTO: 0 /100 WBC (ref 0–0.2)
PH UR STRIP: 6 [PH] (ref 5–8)
PLATELET # BLD AUTO: 242 10*3/MM3 (ref 140–450)
POTASSIUM SERPL-SCNC: 4.2 MMOL/L (ref 3.5–5.2)
PROT SERPL-MCNC: 7.4 G/DL (ref 6–8.5)
PROT UR QL STRIP: NEGATIVE
RBC # BLD AUTO: 4.67 10*6/MM3 (ref 3.77–5.28)
RBC #/AREA URNS HPF: NORMAL /HPF
SODIUM SERPL-SCNC: 142 MMOL/L (ref 136–145)
SP GR UR STRIP: <1.005 (ref 1–1.03)
TRIGL SERPL-MCNC: 97 MG/DL (ref 0–150)
UROBILINOGEN UR STRIP-MCNC: ABNORMAL MG/DL
VLDLC SERPL CALC-MCNC: 17 MG/DL (ref 5–40)
WBC # BLD AUTO: 6.94 10*3/MM3 (ref 3.4–10.8)
WBC #/AREA URNS HPF: NORMAL /HPF

## 2024-04-09 ENCOUNTER — HOSPITAL ENCOUNTER (OUTPATIENT)
Dept: NUCLEAR MEDICINE | Facility: HOSPITAL | Age: 54
Discharge: HOME | End: 2024-04-09
Payer: COMMERCIAL

## 2024-04-09 ENCOUNTER — HOSPITAL ENCOUNTER (OUTPATIENT)
Dept: ULTRASOUND IMAGING | Facility: HOSPITAL | Age: 54
Discharge: HOME | End: 2024-04-09
Payer: COMMERCIAL

## 2024-04-09 DIAGNOSIS — R10.13 EPIGASTRIC PAIN: ICD-10-CM

## 2024-04-09 DIAGNOSIS — R10.11 RIGHT UPPER QUADRANT PAIN: ICD-10-CM

## 2024-04-09 PROCEDURE — 0 TECHNETIUM TC 99M MEBROFENIN KIT: Performed by: INTERNAL MEDICINE

## 2024-04-09 PROCEDURE — 25010000002 SINCALIDE PER 5 MCG: Performed by: INTERNAL MEDICINE

## 2024-04-09 PROCEDURE — A9537 TC99M MEBROFENIN: HCPCS | Performed by: INTERNAL MEDICINE

## 2024-04-09 PROCEDURE — 76705 ECHO EXAM OF ABDOMEN: CPT

## 2024-04-09 PROCEDURE — 78227 HEPATOBIL SYST IMAGE W/DRUG: CPT

## 2024-04-09 RX ORDER — KIT FOR THE PREPARATION OF TECHNETIUM TC 99M MEBROFENIN 45 MG/10ML
1 INJECTION, POWDER, LYOPHILIZED, FOR SOLUTION INTRAVENOUS
Status: COMPLETED | OUTPATIENT
Start: 2024-04-09 | End: 2024-04-09

## 2024-04-09 RX ADMIN — MEBROFENIN 1 DOSE: 45 INJECTION, POWDER, LYOPHILIZED, FOR SOLUTION INTRAVENOUS at 07:55

## 2024-04-09 RX ADMIN — SINCALIDE 1.4 MCG: 5 INJECTION, POWDER, LYOPHILIZED, FOR SOLUTION INTRAVENOUS at 09:00

## 2024-04-10 DIAGNOSIS — K82.9 GALLBLADDER ATTACK: ICD-10-CM

## 2024-04-10 DIAGNOSIS — K80.20 CALCULUS OF GALLBLADDER WITHOUT CHOLECYSTITIS WITHOUT OBSTRUCTION: Primary | ICD-10-CM

## 2024-04-22 NOTE — H&P (VIEW-ONLY)
SURGERY  Edna MITCH Moreno   1970 04/24/24    Chief Complaint:  gallstones    HPI    Ms. Moreno is a very nice 53 y.o. female who happens to be my first cousin, who presents with gallstones.  She has symptoms of right upper quadrant pain that occurred early February, lasting 45 mins, serious pain, resolved on its own, radiated to back.  Last month with similar attack, lasting 20 mins, less soreness post event that the first event.  Was diagnosed with reflux and has been on prevacid for awakening with substernal pain and emesis, with immediate relief (sounds like gallbladder problems)    US GB with multiple layering gallstones, CBD 2.2 cm.        Normal EF% on HIDA CCK.    Past Medical History:   Diagnosis Date    Cholelithiasis 4/11/24    Colon polyp 4/26/19    Most recent 9/22/22    GERD (gastroesophageal reflux disease)     Hiatal hernia     Hypertension 4/2024    Possible a few years    PVC (premature ventricular contraction)     Tennis elbow      Past Surgical History:   Procedure Laterality Date    COLONOSCOPY N/A 04/26/2019    Procedure: COLONOSCOPY into cecum with cold snare polypectomies x4, one unretrieved polyp from ascending colon;  Surgeon: Devang Omer MD;  Location: [facility] ENDOSCOPY;  Service: Gastroenterology    COLONOSCOPY N/A 09/22/2022    Procedure: COLONOSCOPY to cecum and TI with cold biopsy polypectomies;  Surgeon: Enrico Traore MD;  Location:  [facility] ENDOSCOPY;  Service: Gastroenterology;  Laterality: N/A;  pre: h/o colon polyps  post: polyps    ENDOSCOPY      X2    WRIST SURGERY Left      Family History   Problem Relation Age of Onset    Osteoporosis Mother     Cancer Paternal Aunt     Malig Hyperthermia Neg Hx      Social History     Socioeconomic History    Marital status: Single   Tobacco Use    Smoking status: Never    Smokeless tobacco: Never   Vaping Use    Vaping status: Never Used   Substance and Sexual Activity    Alcohol use: Yes     Comment: Occasional weekend    Drug use:  "Never    Sexual activity: Not Currently         Current Outpatient Medications:     Calcium Carbonate-Vit D-Min (CALCIUM 1200 PO), Take  by mouth Daily., Disp: , Rfl:     Drysol 20 % external solution, 1 APPLICATION APPLY ON THE SKIN AT BEDTIME APPLY UNDER ARM EVERY NIGHT, Disp: , Rfl:     lansoprazole (PREVACID) 30 MG capsule, Take 1 capsule by mouth Daily., Disp: 90 capsule, Rfl: 3    norethindrone-ethinyl estradiol (FEMHRT LOW DOSE) 0.5-2.5 MG-MCG per tablet, Take 1 tablet by mouth Daily., Disp: , Rfl:     Vitamin D, Cholecalciferol, (CHOLECALCIFEROL) 10 MCG (400 UNIT) tablet, Take 1 tablet by mouth Daily., Disp: , Rfl:     Allergies   Allergen Reactions    Hydrocodone Nausea Only       PHYSICAL EXAM:    /90   Ht 162.6 cm (64\")   Wt 67.4 kg (148 lb 9.6 oz)   BMI 25.51 kg/m²   Body mass index is 25.51 kg/m².     Constitutional: well developed, well nourished, appears  healthy, stated age  ENMT: Hearing intact, neck without amsses  CVS: RRR, no murmur  Respiratory: CTA, normal respiratory effort   Gastrointestinal: abdomen soft, nontender, abdominal hernia not present, no tattoos  Musculoskeletal: gait normal, muscle mass normal  Neurological: awake and alert, seems to have reasonable capacity for understanding for medical decision making  Psychiatric: appears to have reasonable judgement, pleasant    Radiographic/Lab Findings:   As above    Reviewed: gallbladder pamphlet with risk of need to convert to open, ductal injury and post corinne diarrhea.     IMPRESSION:  Gallstones, symptomatic  Reflux with overlap of symptoms, on prevacid  pseudogout    PLAN:  Lap chol with xray  Consider stopping prevacid one month after surgery to see if she can tolerate without, as some of her symptoms may have been gallbladder all along.     Zenobia Gooden MD  13:23 EDT      In order to provide a more personal and interactive patient experience as well as improve efficiency, this note was started prior to the office visit, " including review of past history and pertinent images, surgeries.

## 2024-04-22 NOTE — PROGRESS NOTES
SURGERY  Edna MITCH Moreno   1970 04/24/24    Chief Complaint:  gallstones    HPI    Ms. Moreno is a very nice 53 y.o. female who happens to be my first cousin, who presents with gallstones.  She has symptoms of right upper quadrant pain that occurred early February, lasting 45 mins, serious pain, resolved on its own, radiated to back.  Last month with similar attack, lasting 20 mins, less soreness post event that the first event.  Was diagnosed with reflux and has been on prevacid for awakening with substernal pain and emesis, with immediate relief (sounds like gallbladder problems)    US GB with multiple layering gallstones, CBD 2.2 cm.        Normal EF% on HIDA CCK.    Past Medical History:   Diagnosis Date    Cholelithiasis 4/11/24    Colon polyp 4/26/19    Most recent 9/22/22    GERD (gastroesophageal reflux disease)     Hiatal hernia     Hypertension 4/2024    Possible a few years    PVC (premature ventricular contraction)     Tennis elbow      Past Surgical History:   Procedure Laterality Date    COLONOSCOPY N/A 04/26/2019    Procedure: COLONOSCOPY into cecum with cold snare polypectomies x4, one unretrieved polyp from ascending colon;  Surgeon: Devang Omer MD;  Location: [facility] ENDOSCOPY;  Service: Gastroenterology    COLONOSCOPY N/A 09/22/2022    Procedure: COLONOSCOPY to cecum and TI with cold biopsy polypectomies;  Surgeon: Enrico Traore MD;  Location:  [facility] ENDOSCOPY;  Service: Gastroenterology;  Laterality: N/A;  pre: h/o colon polyps  post: polyps    ENDOSCOPY      X2    WRIST SURGERY Left      Family History   Problem Relation Age of Onset    Osteoporosis Mother     Cancer Paternal Aunt     Malig Hyperthermia Neg Hx      Social History     Socioeconomic History    Marital status: Single   Tobacco Use    Smoking status: Never    Smokeless tobacco: Never   Vaping Use    Vaping status: Never Used   Substance and Sexual Activity    Alcohol use: Yes     Comment: Occasional weekend    Drug use:  "Never    Sexual activity: Not Currently         Current Outpatient Medications:     Calcium Carbonate-Vit D-Min (CALCIUM 1200 PO), Take  by mouth Daily., Disp: , Rfl:     Drysol 20 % external solution, 1 APPLICATION APPLY ON THE SKIN AT BEDTIME APPLY UNDER ARM EVERY NIGHT, Disp: , Rfl:     lansoprazole (PREVACID) 30 MG capsule, Take 1 capsule by mouth Daily., Disp: 90 capsule, Rfl: 3    norethindrone-ethinyl estradiol (FEMHRT LOW DOSE) 0.5-2.5 MG-MCG per tablet, Take 1 tablet by mouth Daily., Disp: , Rfl:     Vitamin D, Cholecalciferol, (CHOLECALCIFEROL) 10 MCG (400 UNIT) tablet, Take 1 tablet by mouth Daily., Disp: , Rfl:     Allergies   Allergen Reactions    Hydrocodone Nausea Only       PHYSICAL EXAM:    /90   Ht 162.6 cm (64\")   Wt 67.4 kg (148 lb 9.6 oz)   BMI 25.51 kg/m²   Body mass index is 25.51 kg/m².     Constitutional: well developed, well nourished, appears  healthy, stated age  ENMT: Hearing intact, neck without amsses  CVS: RRR, no murmur  Respiratory: CTA, normal respiratory effort   Gastrointestinal: abdomen soft, nontender, abdominal hernia not present, no tattoos  Musculoskeletal: gait normal, muscle mass normal  Neurological: awake and alert, seems to have reasonable capacity for understanding for medical decision making  Psychiatric: appears to have reasonable judgement, pleasant    Radiographic/Lab Findings:   As above    Reviewed: gallbladder pamphlet with risk of need to convert to open, ductal injury and post corinne diarrhea.     IMPRESSION:  Gallstones, symptomatic  Reflux with overlap of symptoms, on prevacid  pseudogout    PLAN:  Lap chol with xray  Consider stopping prevacid one month after surgery to see if she can tolerate without, as some of her symptoms may have been gallbladder all along.     Zenobia Gooden MD  13:23 EDT      In order to provide a more personal and interactive patient experience as well as improve efficiency, this note was started prior to the office visit, " including review of past history and pertinent images, surgeries.

## 2024-04-24 ENCOUNTER — OFFICE VISIT (OUTPATIENT)
Dept: SURGERY | Facility: CLINIC | Age: 54
End: 2024-04-24
Payer: COMMERCIAL

## 2024-04-24 ENCOUNTER — PREP FOR SURGERY (OUTPATIENT)
Dept: OTHER | Facility: HOSPITAL | Age: 54
End: 2024-04-24
Payer: COMMERCIAL

## 2024-04-24 VITALS
SYSTOLIC BLOOD PRESSURE: 145 MMHG | WEIGHT: 148.6 LBS | HEIGHT: 64 IN | BODY MASS INDEX: 25.37 KG/M2 | DIASTOLIC BLOOD PRESSURE: 90 MMHG

## 2024-04-24 DIAGNOSIS — K80.20 GALLSTONES: Primary | ICD-10-CM

## 2024-04-24 PROCEDURE — 99203 OFFICE O/P NEW LOW 30 MIN: CPT | Performed by: SURGERY

## 2024-04-24 RX ORDER — ERGOCALCIFEROL (VITAMIN D2) 10 MCG
400 TABLET ORAL DAILY
COMMUNITY

## 2024-04-24 RX ORDER — ONDANSETRON 2 MG/ML
4 INJECTION INTRAMUSCULAR; INTRAVENOUS EVERY 6 HOURS PRN
OUTPATIENT
Start: 2024-04-24

## 2024-04-24 RX ORDER — SODIUM CHLORIDE 0.9 % (FLUSH) 0.9 %
10 SYRINGE (ML) INJECTION AS NEEDED
OUTPATIENT
Start: 2024-04-24

## 2024-04-24 RX ORDER — ACETAMINOPHEN 500 MG
1000 TABLET ORAL ONCE
OUTPATIENT
Start: 2024-04-24 | End: 2024-04-24

## 2024-04-24 RX ORDER — OXYCODONE HCL 10 MG/1
10 TABLET, FILM COATED, EXTENDED RELEASE ORAL ONCE
OUTPATIENT
Start: 2024-04-24 | End: 2024-04-24

## 2024-04-24 RX ORDER — SODIUM CHLORIDE 9 MG/ML
40 INJECTION, SOLUTION INTRAVENOUS AS NEEDED
OUTPATIENT
Start: 2024-04-24

## 2024-04-24 RX ORDER — SODIUM CHLORIDE 0.9 % (FLUSH) 0.9 %
10 SYRINGE (ML) INJECTION EVERY 12 HOURS SCHEDULED
OUTPATIENT
Start: 2024-04-24

## 2024-05-10 ENCOUNTER — PRE-ADMISSION TESTING (OUTPATIENT)
Dept: PREADMISSION TESTING | Facility: HOSPITAL | Age: 54
End: 2024-05-10
Payer: COMMERCIAL

## 2024-05-10 VITALS
DIASTOLIC BLOOD PRESSURE: 86 MMHG | HEART RATE: 76 BPM | RESPIRATION RATE: 20 BRPM | HEIGHT: 65 IN | SYSTOLIC BLOOD PRESSURE: 142 MMHG | TEMPERATURE: 98.2 F | BODY MASS INDEX: 24.59 KG/M2 | OXYGEN SATURATION: 100 % | WEIGHT: 147.6 LBS

## 2024-05-10 LAB
ANION GAP SERPL CALCULATED.3IONS-SCNC: 13.2 MMOL/L (ref 5–15)
BUN SERPL-MCNC: 10 MG/DL (ref 6–20)
BUN/CREAT SERPL: 13 (ref 7–25)
CALCIUM SPEC-SCNC: 9.3 MG/DL (ref 8.6–10.5)
CHLORIDE SERPL-SCNC: 106 MMOL/L (ref 98–107)
CO2 SERPL-SCNC: 22.8 MMOL/L (ref 22–29)
CREAT SERPL-MCNC: 0.77 MG/DL (ref 0.57–1)
DEPRECATED RDW RBC AUTO: 42.1 FL (ref 37–54)
EGFRCR SERPLBLD CKD-EPI 2021: 91.2 ML/MIN/1.73
ERYTHROCYTE [DISTWIDTH] IN BLOOD BY AUTOMATED COUNT: 12.6 % (ref 12.3–15.4)
GLUCOSE SERPL-MCNC: 98 MG/DL (ref 65–99)
HCT VFR BLD AUTO: 39.3 % (ref 34–46.6)
HGB BLD-MCNC: 13.4 G/DL (ref 12–15.9)
MCH RBC QN AUTO: 31.5 PG (ref 26.6–33)
MCHC RBC AUTO-ENTMCNC: 34.1 G/DL (ref 31.5–35.7)
MCV RBC AUTO: 92.5 FL (ref 79–97)
PLATELET # BLD AUTO: 211 10*3/MM3 (ref 140–450)
PMV BLD AUTO: 11.1 FL (ref 6–12)
POTASSIUM SERPL-SCNC: 3.6 MMOL/L (ref 3.5–5.2)
RBC # BLD AUTO: 4.25 10*6/MM3 (ref 3.77–5.28)
SODIUM SERPL-SCNC: 142 MMOL/L (ref 136–145)
WBC NRBC COR # BLD AUTO: 5.5 10*3/MM3 (ref 3.4–10.8)

## 2024-05-10 PROCEDURE — 36415 COLL VENOUS BLD VENIPUNCTURE: CPT

## 2024-05-10 PROCEDURE — 80048 BASIC METABOLIC PNL TOTAL CA: CPT

## 2024-05-10 PROCEDURE — 85027 COMPLETE CBC AUTOMATED: CPT

## 2024-05-10 NOTE — DISCHARGE INSTRUCTIONS
Take the following medications the morning of surgery:    PREVACID    If you are on prescription narcotic pain medication to control your pain you may also take that medication the morning of surgery.    General Instructions:  Do not eat solid food after midnight the night before surgery.  You may drink clear liquids day of surgery but must stop at least one hour before your hospital arrival time.  It is beneficial for you to have a clear drink that contains carbohydrates the day of surgery.  We suggest a 12 to 20 ounce bottle of Gatorade or Powerade for non-diabetic patients or a 12 to 20 ounce bottle of G2 or Powerade Zero for diabetic patients. (Pediatric patients, are not advised to drink a 12 to 20 ounce carbohydrate drink)    Clear liquids are liquids you can see through.  Nothing red in color.     Plain water                               Sports drinks  Sodas                                   Gelatin (Jell-O)  Fruit juices without pulp such as white grape juice and apple juice  Popsicles that contain no fruit or yogurt  Tea or coffee (no cream or milk added)  Gatorade / Powerade  G2 / Powerade Zero    Infants may have breast milk up to four hours before surgery.  Infants drinking formula may drink formula up to six hours before surgery.   Patients who avoid smoking, chewing tobacco and alcohol for 4 weeks prior to surgery have a reduced risk of post-operative complications.  Quit smoking as many days before surgery as you can.  Do not smoke, use chewing tobacco or drink alcohol the day of surgery.   If applicable bring your C-PAP/ BI-PAP machine in with you to preop day of surgery.  Bring any papers given to you in the doctor’s office.  Wear clean comfortable clothes.  Do not wear contact lenses, false eyelashes or make-up.  Bring a case for your glasses.   Bring crutches or walker if applicable.  Remove all piercings.  Leave jewelry and any other valuables at home.  Hair extensions with metal clips must be  removed prior to surgery.  The Pre-Admission Testing nurse will instruct you to bring medications if unable to obtain an accurate list in Pre-Admission Testing.      Preventing a Surgical Site Infection:  For 2 to 3 days before surgery, avoid shaving with a razor because the razor can irritate skin and make it easier to develop an infection.    Any areas of open skin can increase the risk of a post-operative wound infection by allowing bacteria to enter and travel throughout the body.  Notify your surgeon if you have any skin wounds / rashes even if it is not near the expected surgical site.  The area will need assessed to determine if surgery should be delayed until it is healed.  The night prior to surgery shower using a fresh bar of anti-bacterial soap (such as Dial) and clean washcloth.  Sleep in a clean bed with clean clothing.  Do not allow pets to sleep with you.  Shower on the morning of surgery using a fresh bar of anti-bacterial soap (such as Dial) and clean washcloth.  Dry with a clean towel and dress in clean clothing.  Ask your surgeon if you will be receiving antibiotics prior to surgery.  Make sure you, your family, and all healthcare providers clean their hands with soap and water or an alcohol based hand  before caring for you or your wound.    Day of surgery:  Your arrival time is approximately two hours before your scheduled surgery time.  Upon arrival, a Pre-op nurse and Anesthesiologist will review your health history, obtain vital signs, and answer questions you may have.  The only belongings needed at this time will be a list of your home medications and if applicable your C-PAP/BI-PAP machine.  A Pre-op nurse will start an IV and you may receive medication in preparation for surgery, including something to help you relax.     Please be aware that surgery does come with discomfort.  We want to make every effort to control your discomfort so please discuss any uncontrolled symptoms  with your nurse.   Your doctor will most likely have prescribed pain medications.      If you are going home after surgery you will receive individualized written care instructions before being discharged.  A responsible adult must drive you to and from the hospital on the day of your surgery and ideally stay with you through the night.   .  Discharge prescriptions can be filled by the hospital pharmacy during regular pharmacy hours.  If you are having surgery late in the day/evening your prescription may be e-prescribed to your pharmacy.  Please verify your pharmacy hours or chose a 24 hour pharmacy to avoid not having access to your prescription because your pharmacy has closed for the day.    If you are staying overnight following surgery, you will be transported to your hospital room following the recovery period.  [facility] has all private rooms.    If you have any questions please call Pre-Admission Testing at [phone].  Deductibles and co-payments are collected on the day of service. Please be prepared to pay the required co-pay, deductible or deposit on the day of service as defined by your plan.    Call your surgeon immediately if you experience any of the following symptoms:  Sore Throat  Shortness of Breath or difficulty breathing  Cough  Chills  Body soreness or muscle pain  Headache  Fever  New loss of taste or smell  Do not arrive for your surgery ill.  Your procedure will need to be rescheduled to another time.  You will need to call your physician before the day of surgery to avoid any unnecessary exposure to hospital staff as well as other patients.      CHLORHEXIDINE CLOTH INSTRUCTIONS  The morning of surgery follow these instructions using the Chlorhexidine cloths you've been given.  These steps reduce bacteria on the body.  Do not use the cloths near your eyes, ears mouth, genitalia or on open wounds.  Throw the cloths away after use but do not try to flush them down a  toilet.      Open and remove one cloth at a time from the package.    Leave the cloth unfolded and begin the bathing.  Massage the skin with the cloths using gentle pressure to remove bacteria.  Do not scrub harshly.   Follow the steps below with one 2% CHG cloth per area (6 total cloths).  One cloth for neck, shoulders and chest.  One cloth for both arms, hands, fingers and underarms (do underarms last).  One cloth for the abdomen followed by groin.  One cloth for right leg and foot including between the toes.  One cloth for left leg and foot including between the toes.  The last cloth is to be used for the back of the neck, back and buttocks.    Allow the CHG to air dry 3 minutes on the skin which will give it time to work and decrease the chance of irritation.  The skin may feel sticky until it is dry.  Do not rinse with water or any other liquid or you will lose the beneficial effects of the CHG.  If mild skin irritation occurs, do rinse the skin to remove the CHG.  Report this to the nurse at time of admission.  Do not apply lotions, creams, ointments, deodorants or perfumes after using the clothes. Dress in clean clothes before coming to the hospital.

## 2024-05-17 ENCOUNTER — ANESTHESIA EVENT (OUTPATIENT)
Dept: PERIOP | Facility: HOSPITAL | Age: 54
End: 2024-05-17
Payer: COMMERCIAL

## 2024-05-17 ENCOUNTER — APPOINTMENT (OUTPATIENT)
Dept: GENERAL RADIOLOGY | Facility: HOSPITAL | Age: 54
End: 2024-05-17
Payer: COMMERCIAL

## 2024-05-17 ENCOUNTER — HOSPITAL ENCOUNTER (OUTPATIENT)
Facility: HOSPITAL | Age: 54
Setting detail: HOSPITAL OUTPATIENT SURGERY
Discharge: HOME | End: 2024-05-17
Attending: SURGERY | Admitting: SURGERY
Payer: COMMERCIAL

## 2024-05-17 ENCOUNTER — ANESTHESIA (OUTPATIENT)
Dept: PERIOP | Facility: HOSPITAL | Age: 54
End: 2024-05-17
Payer: COMMERCIAL

## 2024-05-17 VITALS
SYSTOLIC BLOOD PRESSURE: 135 MMHG | HEART RATE: 67 BPM | DIASTOLIC BLOOD PRESSURE: 76 MMHG | OXYGEN SATURATION: 97 % | HEIGHT: 64 IN | BODY MASS INDEX: 24.92 KG/M2 | WEIGHT: 146 LBS | RESPIRATION RATE: 16 BRPM | TEMPERATURE: 98 F

## 2024-05-17 DIAGNOSIS — K80.20 GALLSTONES: ICD-10-CM

## 2024-05-17 PROCEDURE — 0 IOTHALAMATE 60 % SOLUTION: Performed by: SURGERY

## 2024-05-17 PROCEDURE — 25810000003 SODIUM CHLORIDE PER 500 ML: Performed by: SURGERY

## 2024-05-17 PROCEDURE — 25010000002 GLYCOPYRROLATE 0.2 MG/ML SOLUTION: Performed by: NURSE ANESTHETIST, CERTIFIED REGISTERED

## 2024-05-17 PROCEDURE — 25010000002 CEFAZOLIN PER 500 MG: Performed by: SURGERY

## 2024-05-17 PROCEDURE — 88304 TISSUE EXAM BY PATHOLOGIST: CPT | Performed by: SURGERY

## 2024-05-17 PROCEDURE — 25010000002 KETOROLAC TROMETHAMINE PER 15 MG: Performed by: NURSE ANESTHETIST, CERTIFIED REGISTERED

## 2024-05-17 PROCEDURE — 25010000002 SUGAMMADEX 200 MG/2ML SOLUTION: Performed by: NURSE ANESTHETIST, CERTIFIED REGISTERED

## 2024-05-17 PROCEDURE — 25010000002 ONDANSETRON PER 1 MG: Performed by: NURSE ANESTHETIST, CERTIFIED REGISTERED

## 2024-05-17 PROCEDURE — 25010000002 HYDROMORPHONE 1 MG/ML SOLUTION: Performed by: NURSE ANESTHETIST, CERTIFIED REGISTERED

## 2024-05-17 PROCEDURE — 25010000002 FENTANYL CITRATE (PF) 50 MCG/ML SOLUTION: Performed by: NURSE ANESTHETIST, CERTIFIED REGISTERED

## 2024-05-17 PROCEDURE — 47563 LAPARO CHOLECYSTECTOMY/GRAPH: CPT

## 2024-05-17 PROCEDURE — 25010000002 DEXAMETHASONE PER 1 MG: Performed by: NURSE ANESTHETIST, CERTIFIED REGISTERED

## 2024-05-17 PROCEDURE — 25010000002 PROPOFOL 10 MG/ML EMULSION: Performed by: NURSE ANESTHETIST, CERTIFIED REGISTERED

## 2024-05-17 PROCEDURE — 25810000003 LACTATED RINGERS PER 1000 ML: Performed by: ANESTHESIOLOGY

## 2024-05-17 PROCEDURE — 74300 X-RAY BILE DUCTS/PANCREAS: CPT

## 2024-05-17 PROCEDURE — 47563 LAPARO CHOLECYSTECTOMY/GRAPH: CPT | Performed by: SURGERY

## 2024-05-17 DEVICE — HORIZON TI ML 6 CLIPS/CART
Type: IMPLANTABLE DEVICE | Site: ABDOMEN | Status: FUNCTIONAL
Brand: WECK

## 2024-05-17 RX ORDER — PROMETHAZINE HYDROCHLORIDE 25 MG/1
25 TABLET ORAL ONCE AS NEEDED
Status: DISCONTINUED | OUTPATIENT
Start: 2024-05-17 | End: 2024-05-17 | Stop reason: HOSPADM

## 2024-05-17 RX ORDER — SODIUM CHLORIDE 0.9 % (FLUSH) 0.9 %
3 SYRINGE (ML) INJECTION EVERY 12 HOURS SCHEDULED
Status: DISCONTINUED | OUTPATIENT
Start: 2024-05-17 | End: 2024-05-17 | Stop reason: HOSPADM

## 2024-05-17 RX ORDER — DROPERIDOL 2.5 MG/ML
0.62 INJECTION, SOLUTION INTRAMUSCULAR; INTRAVENOUS
Status: DISCONTINUED | OUTPATIENT
Start: 2024-05-17 | End: 2024-05-17 | Stop reason: HOSPADM

## 2024-05-17 RX ORDER — SODIUM CHLORIDE 0.9 % (FLUSH) 0.9 %
10 SYRINGE (ML) INJECTION EVERY 12 HOURS SCHEDULED
Status: DISCONTINUED | OUTPATIENT
Start: 2024-05-17 | End: 2024-05-17 | Stop reason: HOSPADM

## 2024-05-17 RX ORDER — TRAMADOL HYDROCHLORIDE 50 MG/1
50 TABLET ORAL ONCE AS NEEDED
Status: COMPLETED | OUTPATIENT
Start: 2024-05-17 | End: 2024-05-17

## 2024-05-17 RX ORDER — ONDANSETRON 2 MG/ML
INJECTION INTRAMUSCULAR; INTRAVENOUS AS NEEDED
Status: DISCONTINUED | OUTPATIENT
Start: 2024-05-17 | End: 2024-05-17 | Stop reason: SURG

## 2024-05-17 RX ORDER — LIDOCAINE HYDROCHLORIDE 20 MG/ML
INJECTION, SOLUTION INFILTRATION; PERINEURAL AS NEEDED
Status: DISCONTINUED | OUTPATIENT
Start: 2024-05-17 | End: 2024-05-17 | Stop reason: SURG

## 2024-05-17 RX ORDER — MIDAZOLAM HYDROCHLORIDE 1 MG/ML
1 INJECTION INTRAMUSCULAR; INTRAVENOUS
Status: DISCONTINUED | OUTPATIENT
Start: 2024-05-17 | End: 2024-05-17 | Stop reason: HOSPADM

## 2024-05-17 RX ORDER — HYDROCODONE BITARTRATE AND ACETAMINOPHEN 5; 325 MG/1; MG/1
1 TABLET ORAL ONCE AS NEEDED
Status: DISCONTINUED | OUTPATIENT
Start: 2024-05-17 | End: 2024-05-17 | Stop reason: HOSPADM

## 2024-05-17 RX ORDER — FENTANYL CITRATE 50 UG/ML
50 INJECTION, SOLUTION INTRAMUSCULAR; INTRAVENOUS
Status: DISCONTINUED | OUTPATIENT
Start: 2024-05-17 | End: 2024-05-17 | Stop reason: HOSPADM

## 2024-05-17 RX ORDER — BUPIVACAINE HYDROCHLORIDE AND EPINEPHRINE 5; 5 MG/ML; UG/ML
INJECTION, SOLUTION EPIDURAL; INTRACAUDAL; PERINEURAL AS NEEDED
Status: DISCONTINUED | OUTPATIENT
Start: 2024-05-17 | End: 2024-05-17 | Stop reason: HOSPADM

## 2024-05-17 RX ORDER — SODIUM CHLORIDE 9 MG/ML
40 INJECTION, SOLUTION INTRAVENOUS AS NEEDED
Status: DISCONTINUED | OUTPATIENT
Start: 2024-05-17 | End: 2024-05-17 | Stop reason: HOSPADM

## 2024-05-17 RX ORDER — FLUMAZENIL 0.1 MG/ML
0.2 INJECTION INTRAVENOUS AS NEEDED
Status: DISCONTINUED | OUTPATIENT
Start: 2024-05-17 | End: 2024-05-17 | Stop reason: HOSPADM

## 2024-05-17 RX ORDER — GLYCOPYRROLATE 0.2 MG/ML
INJECTION INTRAMUSCULAR; INTRAVENOUS AS NEEDED
Status: DISCONTINUED | OUTPATIENT
Start: 2024-05-17 | End: 2024-05-17 | Stop reason: SURG

## 2024-05-17 RX ORDER — IBUPROFEN 400 MG/1
400 TABLET ORAL EVERY 12 HOURS
Qty: 6 TABLET | Refills: 0 | Status: SHIPPED | OUTPATIENT
Start: 2024-05-17 | End: 2024-05-20

## 2024-05-17 RX ORDER — EPHEDRINE SULFATE 50 MG/ML
5 INJECTION, SOLUTION INTRAVENOUS ONCE AS NEEDED
Status: DISCONTINUED | OUTPATIENT
Start: 2024-05-17 | End: 2024-05-17 | Stop reason: HOSPADM

## 2024-05-17 RX ORDER — ACETAMINOPHEN 500 MG
1000 TABLET ORAL 2 TIMES DAILY
Qty: 12 TABLET | Refills: 0 | Status: SHIPPED | OUTPATIENT
Start: 2024-05-17 | End: 2024-05-20

## 2024-05-17 RX ORDER — MAGNESIUM HYDROXIDE 1200 MG/15ML
LIQUID ORAL AS NEEDED
Status: DISCONTINUED | OUTPATIENT
Start: 2024-05-17 | End: 2024-05-17 | Stop reason: HOSPADM

## 2024-05-17 RX ORDER — FENTANYL CITRATE 50 UG/ML
INJECTION, SOLUTION INTRAMUSCULAR; INTRAVENOUS AS NEEDED
Status: DISCONTINUED | OUTPATIENT
Start: 2024-05-17 | End: 2024-05-17 | Stop reason: SURG

## 2024-05-17 RX ORDER — LIDOCAINE HYDROCHLORIDE 10 MG/ML
0.5 INJECTION, SOLUTION INFILTRATION; PERINEURAL ONCE AS NEEDED
Status: DISCONTINUED | OUTPATIENT
Start: 2024-05-17 | End: 2024-05-17 | Stop reason: HOSPADM

## 2024-05-17 RX ORDER — ONDANSETRON 2 MG/ML
4 INJECTION INTRAMUSCULAR; INTRAVENOUS ONCE AS NEEDED
Status: DISCONTINUED | OUTPATIENT
Start: 2024-05-17 | End: 2024-05-17 | Stop reason: HOSPADM

## 2024-05-17 RX ORDER — TRAMADOL HYDROCHLORIDE 50 MG/1
50 TABLET ORAL EVERY 4 HOURS PRN
Qty: 7 TABLET | Refills: 0 | Status: SHIPPED | OUTPATIENT
Start: 2024-05-17 | End: 2024-05-24

## 2024-05-17 RX ORDER — ACETAMINOPHEN 500 MG
1000 TABLET ORAL ONCE
Status: COMPLETED | OUTPATIENT
Start: 2024-05-17 | End: 2024-05-17

## 2024-05-17 RX ORDER — SODIUM CHLORIDE, SODIUM LACTATE, POTASSIUM CHLORIDE, CALCIUM CHLORIDE 600; 310; 30; 20 MG/100ML; MG/100ML; MG/100ML; MG/100ML
9 INJECTION, SOLUTION INTRAVENOUS CONTINUOUS
Status: DISCONTINUED | OUTPATIENT
Start: 2024-05-17 | End: 2024-05-17 | Stop reason: HOSPADM

## 2024-05-17 RX ORDER — HYDROMORPHONE HYDROCHLORIDE 1 MG/ML
0.5 INJECTION, SOLUTION INTRAMUSCULAR; INTRAVENOUS; SUBCUTANEOUS
Status: DISCONTINUED | OUTPATIENT
Start: 2024-05-17 | End: 2024-05-17 | Stop reason: HOSPADM

## 2024-05-17 RX ORDER — DIPHENHYDRAMINE HYDROCHLORIDE 50 MG/ML
12.5 INJECTION INTRAMUSCULAR; INTRAVENOUS
Status: DISCONTINUED | OUTPATIENT
Start: 2024-05-17 | End: 2024-05-17 | Stop reason: HOSPADM

## 2024-05-17 RX ORDER — OXYCODONE AND ACETAMINOPHEN 7.5; 325 MG/1; MG/1
1 TABLET ORAL EVERY 4 HOURS PRN
Status: DISCONTINUED | OUTPATIENT
Start: 2024-05-17 | End: 2024-05-17 | Stop reason: HOSPADM

## 2024-05-17 RX ORDER — SODIUM CHLORIDE 9 MG/ML
INJECTION, SOLUTION INTRAVENOUS AS NEEDED
Status: DISCONTINUED | OUTPATIENT
Start: 2024-05-17 | End: 2024-05-17 | Stop reason: HOSPADM

## 2024-05-17 RX ORDER — SODIUM CHLORIDE 0.9 % (FLUSH) 0.9 %
10 SYRINGE (ML) INJECTION AS NEEDED
Status: DISCONTINUED | OUTPATIENT
Start: 2024-05-17 | End: 2024-05-17 | Stop reason: HOSPADM

## 2024-05-17 RX ORDER — ONDANSETRON 4 MG/1
4 TABLET, FILM COATED ORAL EVERY 8 HOURS PRN
Qty: 10 TABLET | Refills: 0 | Status: SHIPPED | OUTPATIENT
Start: 2024-05-17

## 2024-05-17 RX ORDER — OXYCODONE HCL 10 MG/1
10 TABLET, FILM COATED, EXTENDED RELEASE ORAL ONCE
Status: COMPLETED | OUTPATIENT
Start: 2024-05-17 | End: 2024-05-17

## 2024-05-17 RX ORDER — HYDRALAZINE HYDROCHLORIDE 20 MG/ML
5 INJECTION INTRAMUSCULAR; INTRAVENOUS
Status: DISCONTINUED | OUTPATIENT
Start: 2024-05-17 | End: 2024-05-17 | Stop reason: HOSPADM

## 2024-05-17 RX ORDER — FENTANYL CITRATE 50 UG/ML
50 INJECTION, SOLUTION INTRAMUSCULAR; INTRAVENOUS ONCE AS NEEDED
Status: DISCONTINUED | OUTPATIENT
Start: 2024-05-17 | End: 2024-05-17 | Stop reason: HOSPADM

## 2024-05-17 RX ORDER — ROCURONIUM BROMIDE 10 MG/ML
INJECTION, SOLUTION INTRAVENOUS AS NEEDED
Status: DISCONTINUED | OUTPATIENT
Start: 2024-05-17 | End: 2024-05-17 | Stop reason: SURG

## 2024-05-17 RX ORDER — KETOROLAC TROMETHAMINE 30 MG/ML
INJECTION, SOLUTION INTRAMUSCULAR; INTRAVENOUS AS NEEDED
Status: DISCONTINUED | OUTPATIENT
Start: 2024-05-17 | End: 2024-05-17 | Stop reason: SURG

## 2024-05-17 RX ORDER — ACETAMINOPHEN 500 MG
500 TABLET ORAL EVERY 12 HOURS
Qty: 6 TABLET | Refills: 0 | Status: CANCELLED | OUTPATIENT
Start: 2024-05-17 | End: 2024-05-20

## 2024-05-17 RX ORDER — ONDANSETRON 2 MG/ML
4 INJECTION INTRAMUSCULAR; INTRAVENOUS EVERY 6 HOURS PRN
Status: DISCONTINUED | OUTPATIENT
Start: 2024-05-17 | End: 2024-05-17 | Stop reason: HOSPADM

## 2024-05-17 RX ORDER — IPRATROPIUM BROMIDE AND ALBUTEROL SULFATE 2.5; .5 MG/3ML; MG/3ML
3 SOLUTION RESPIRATORY (INHALATION) ONCE AS NEEDED
Status: DISCONTINUED | OUTPATIENT
Start: 2024-05-17 | End: 2024-05-17 | Stop reason: HOSPADM

## 2024-05-17 RX ORDER — PROMETHAZINE HYDROCHLORIDE 25 MG/1
25 SUPPOSITORY RECTAL ONCE AS NEEDED
Status: DISCONTINUED | OUTPATIENT
Start: 2024-05-17 | End: 2024-05-17 | Stop reason: HOSPADM

## 2024-05-17 RX ORDER — NALOXONE HCL 0.4 MG/ML
0.2 VIAL (ML) INJECTION AS NEEDED
Status: DISCONTINUED | OUTPATIENT
Start: 2024-05-17 | End: 2024-05-17 | Stop reason: HOSPADM

## 2024-05-17 RX ORDER — DEXAMETHASONE SODIUM PHOSPHATE 4 MG/ML
INJECTION, SOLUTION INTRA-ARTICULAR; INTRALESIONAL; INTRAMUSCULAR; INTRAVENOUS; SOFT TISSUE AS NEEDED
Status: DISCONTINUED | OUTPATIENT
Start: 2024-05-17 | End: 2024-05-17 | Stop reason: SURG

## 2024-05-17 RX ORDER — FAMOTIDINE 10 MG/ML
20 INJECTION, SOLUTION INTRAVENOUS ONCE
Status: COMPLETED | OUTPATIENT
Start: 2024-05-17 | End: 2024-05-17

## 2024-05-17 RX ORDER — LABETALOL HYDROCHLORIDE 5 MG/ML
5 INJECTION, SOLUTION INTRAVENOUS
Status: DISCONTINUED | OUTPATIENT
Start: 2024-05-17 | End: 2024-05-17 | Stop reason: HOSPADM

## 2024-05-17 RX ORDER — SODIUM CHLORIDE 0.9 % (FLUSH) 0.9 %
3-10 SYRINGE (ML) INJECTION AS NEEDED
Status: DISCONTINUED | OUTPATIENT
Start: 2024-05-17 | End: 2024-05-17 | Stop reason: HOSPADM

## 2024-05-17 RX ORDER — PROPOFOL 10 MG/ML
VIAL (ML) INTRAVENOUS AS NEEDED
Status: DISCONTINUED | OUTPATIENT
Start: 2024-05-17 | End: 2024-05-17 | Stop reason: SURG

## 2024-05-17 RX ADMIN — LIDOCAINE HYDROCHLORIDE 60 MG: 20 INJECTION, SOLUTION INFILTRATION; PERINEURAL at 08:02

## 2024-05-17 RX ADMIN — ACETAMINOPHEN 1000 MG: 500 TABLET ORAL at 07:44

## 2024-05-17 RX ADMIN — KETOROLAC TROMETHAMINE 30 MG: 30 INJECTION, SOLUTION INTRAMUSCULAR at 08:44

## 2024-05-17 RX ADMIN — SUGAMMADEX 200 MG: 100 INJECTION, SOLUTION INTRAVENOUS at 08:53

## 2024-05-17 RX ADMIN — OXYCODONE HYDROCHLORIDE 10 MG: 10 TABLET, FILM COATED, EXTENDED RELEASE ORAL at 07:44

## 2024-05-17 RX ADMIN — SODIUM CHLORIDE, POTASSIUM CHLORIDE, SODIUM LACTATE AND CALCIUM CHLORIDE 9 ML/HR: 600; 310; 30; 20 INJECTION, SOLUTION INTRAVENOUS at 07:43

## 2024-05-17 RX ADMIN — DEXAMETHASONE SODIUM PHOSPHATE 8 MG: 4 INJECTION, SOLUTION INTRA-ARTICULAR; INTRALESIONAL; INTRAMUSCULAR; INTRAVENOUS; SOFT TISSUE at 08:02

## 2024-05-17 RX ADMIN — SODIUM CHLORIDE 2000 MG: 900 INJECTION INTRAVENOUS at 07:54

## 2024-05-17 RX ADMIN — TRAMADOL HYDROCHLORIDE 50 MG: 50 TABLET ORAL at 09:46

## 2024-05-17 RX ADMIN — ONDANSETRON 4 MG: 2 INJECTION INTRAMUSCULAR; INTRAVENOUS at 08:41

## 2024-05-17 RX ADMIN — ROCURONIUM BROMIDE 50 MG: 10 INJECTION, SOLUTION INTRAVENOUS at 08:02

## 2024-05-17 RX ADMIN — FAMOTIDINE 20 MG: 10 INJECTION INTRAVENOUS at 07:44

## 2024-05-17 RX ADMIN — PROPOFOL 150 MG: 10 INJECTION, EMULSION INTRAVENOUS at 08:02

## 2024-05-17 RX ADMIN — HYDROMORPHONE HYDROCHLORIDE 0.5 MG: 1 INJECTION, SOLUTION INTRAMUSCULAR; INTRAVENOUS; SUBCUTANEOUS at 08:58

## 2024-05-17 RX ADMIN — GLYCOPYRROLATE 0.1 MG: 0.2 INJECTION INTRAMUSCULAR; INTRAVENOUS at 08:02

## 2024-05-17 RX ADMIN — FENTANYL CITRATE 50 MCG: 50 INJECTION, SOLUTION INTRAMUSCULAR; INTRAVENOUS at 08:13

## 2024-05-17 NOTE — ANESTHESIA PREPROCEDURE EVALUATION
Anesthesia Evaluation     Patient summary reviewed   no history of anesthetic complications:   NPO Solid Status: > 8 hours  NPO Liquid Status: > 2 hours           Airway   Mallampati: II  TM distance: >3 FB  Neck ROM: full  No difficulty expected  Dental - normal exam     Pulmonary     breath sounds clear to auscultation  (-) shortness of breath, recent URI, not a smoker  Cardiovascular   Exercise tolerance: good (4-7 METS)    Rhythm: regular  Rate: normal    (+) dysrhythmias PVC, hyperlipidemia  (-) past MI, angina      Neuro/Psych  (-) seizures, CVA  GI/Hepatic/Renal/Endo    (+) hiatal hernia, GERD  (-)  obesityRenal disease: Cr 0.77. Diabetes: HbA1c 5.5.    Musculoskeletal     (-) neck stiffness  Abdominal    Substance History      OB/GYN          Other   arthritis,     (-) blood dyscrasia                    Anesthesia Plan    ASA 2     general     (I have reviewed the patient's history and chart with the patient, including all pertinent laboratory results and imaging. I have explained the risks of anesthesia including but not limited to dental damage, corneal abrasion, nerve injury, MI, stroke, aspiration, and death. Patient has agreed to proceed.  )  intravenous induction     Anesthetic plan, risks, benefits, and alternatives have been provided, discussed and informed consent has been obtained with: patient.    Use of blood products discussed with patient .        CODE STATUS:

## 2024-05-17 NOTE — ANESTHESIA PROCEDURE NOTES
Airway  Urgency: elective    Date/Time: 5/17/2024 8:05 AM  Airway not difficult    General Information and Staff    Patient location during procedure: OR  Anesthesiologist: Abiodun Acosta DO  CRNA/CAA: Audra Ball CRNA    Indications and Patient Condition  Indications for airway management: airway protection    Preoxygenated: yes  Mask difficulty assessment: 2 - vent by mask + OA or adjuvant +/- NMBA    Final Airway Details  Final airway type: endotracheal airway      Successful airway: ETT  Cuffed: yes   Successful intubation technique: direct laryngoscopy  Facilitating devices/methods: intubating stylet and cricoid pressure  Endotracheal tube insertion site: oral  Blade: Han  Blade size: 3  ETT size (mm): 7.0  Cormack-Lehane Classification: grade IIa - partial view of glottis  Placement verified by: chest auscultation and capnometry   Cuff volume (mL): 6  Measured from: teeth  ETT/EBT  to teeth (cm): 21  Number of attempts at approach: 1  Assessment: lips, teeth, and gum same as pre-op and atraumatic intubation

## 2024-05-17 NOTE — OP NOTE
Laparoscopic Cholecystectomy :  Giacomo  Edna L Josh  1970    Procedure Date: 05/17/24    Pre-op Diagnosis:   Gallstones, symptomatic  Reflux with overlap of symptoms, on prevacid    Post-op Diagnosis:   same    Procedure: Laparoscopic cholecystectomy with cholangiogram    Surgeon: Giacomo    Assistant: Imani    Indications: as above    Associated Issues:  pseudogout    Findings:   High riding right hepatic artery with very short cystic artery  Cholangiogram normal thru a small cystic duct    Recommendations:   Routine recuparation  Trial DC prevacid in one month with some likely overlap of symptoms and possibility of being able to eliminate that need, now that gallbladder is gone.    Technique:     General anesthetic was given.  IV antibiotics were given (kefzol).  The abdomen was prepped with ChloraPrep and draped sterilely.  A small incision was made with 11 blade above the umbilicus, CO2 insufflated via the Veress needle, followed by a 5 mm trocar, and a 5 mm laparoscope.  Three additional trocars were placed under direct vision, being an 11 mm bladeless in the subxiphoid and two 5 mm nondisposables in the right upper quadrant.    The gallbladder was not attached to adehsions.  The cystic duct was dissected out.  It was clipped proximally, opened with the scissors to allow placement of the cholangiocath, which was placed and cholangiogram performed with findings as noted.  The catheter was then removed and 2 clips were placed on the duct distally and it was divided.  The artery was dissected out carefully, being very short.  It was clipped twice proximally, once distally and divided.  The gallbladder was dissected out using the cautery on 20.  The plane between the gallbladder wall and the liver fossa was straightforward .  The gallbladder was placed into an endocatch bag.   Irrigation and suction were carried out to ensure no bilious or bloody efflux from the liver fossa.  The gallbladder was then  brought out through the subxiphoid site, doing so without need to enlarge that site.   The subxiphoid site was then closed at the fascial level with 0 Vicryl suture, simple, the dermis at that site with 3-0 Vicryl.  Skin at all sites with 5-0 Vicryl.  Exofin applied.    Specimen: gallbladder  EBL: minimal    Zenobia Gooden MD  05/17/24  09:14 EDT      Assistant provided help with exposure, traction, camera holding, suturing, closure and dressings and was critical to a safe and expeditious procedure.

## 2024-05-17 NOTE — ANESTHESIA POSTPROCEDURE EVALUATION
"Patient: Edna Moreno    Procedure Summary       Date: 05/17/24 Room / Location: [facility] OR  / [facility] MAIN OR    Anesthesia Start: 0757 Anesthesia Stop: 0904    Procedure: laparoscopic cholecystectomy with cholangiogram, possible open (Abdomen) Diagnosis:       Gallstones      (Gallstones [K80.20])    Surgeons: Zenobia Gooden MD Provider: Abiodun Acosta DO    Anesthesia Type: general ASA Status: 2            Anesthesia Type: general    Vitals  Vitals Value Taken Time   /79 05/17/24 0945   Temp 36.7 °C (98 °F) 05/17/24 0945   Pulse 72 05/17/24 0957   Resp 16 05/17/24 0945   SpO2 96 % 05/17/24 0957   Vitals shown include unfiled device data.        Post Anesthesia Care and Evaluation    Patient location during evaluation: bedside  Patient participation: complete - patient participated  Level of consciousness: awake and alert  Pain management: adequate    Airway patency: patent  Anesthetic complications: No anesthetic complications  PONV Status: controlled  Cardiovascular status: acceptable and hemodynamically stable  Respiratory status: acceptable, spontaneous ventilation and nonlabored ventilation  Hydration status: acceptable    Comments: /76   Pulse 69   Temp 36.7 °C (98 °F)   Resp 16   Ht 162.6 cm (64\")   Wt 66.2 kg (146 lb)   SpO2 98%   BMI 25.06 kg/m²       "

## 2024-05-18 LAB
LAB AP CASE REPORT: NORMAL
PATH REPORT.FINAL DX SPEC: NORMAL
PATH REPORT.GROSS SPEC: NORMAL

## 2024-05-28 RX ORDER — LANSOPRAZOLE 30 MG/1
30 CAPSULE, DELAYED RELEASE ORAL DAILY
Qty: 90 CAPSULE | Refills: 3 | Status: SHIPPED | OUTPATIENT
Start: 2024-05-28

## 2024-05-30 PROBLEM — K80.20 GALLSTONES: Status: RESOLVED | Noted: 2024-04-24 | Resolved: 2024-05-30

## 2024-05-30 NOTE — PROGRESS NOTES
SURGERY: ADELAIDA  Post op Visit  Edna Moreno  06/03/2024    Ms. Moreno  presents today after having undergone Surgery 5/17/2024, of a laparoscopic cholecystectomy with x-ray.  This was for symptomatic gallstones, and a suspected possible overlap of symptoms of reflux on Prevacid.  Intraoperative findings were a high riding right hepatic artery and very short cystic artery, cholangiogram normal through a small cystic duct.  I had recommended that she continue her Prevacid for 1 month but then do a trial of stopping that to see if removal of her gallbladder has removed her reflux symptoms as well.    Her path showed chronic cholecystitis, cholelithiasis and cholesterolosis.    Today, she states she had pretty significant soreness but her incisions look good.  She doesn't have anything that sounds abnormal.     Reviewed her c scope and results from 2022 and feel it would be reasonable to repeat in 9/2025.  We will put a reminder in.     Zenobia Gooden MD  16:42 EDT

## 2024-06-03 ENCOUNTER — OFFICE VISIT (OUTPATIENT)
Dept: SURGERY | Facility: CLINIC | Age: 54
End: 2024-06-03
Payer: COMMERCIAL

## 2024-06-03 VITALS
SYSTOLIC BLOOD PRESSURE: 132 MMHG | HEIGHT: 64 IN | WEIGHT: 144 LBS | DIASTOLIC BLOOD PRESSURE: 82 MMHG | BODY MASS INDEX: 24.59 KG/M2

## 2024-06-03 DIAGNOSIS — K80.20 GALLSTONES: Primary | ICD-10-CM

## 2024-06-03 PROCEDURE — 99024 POSTOP FOLLOW-UP VISIT: CPT | Performed by: SURGERY

## 2025-02-27 ENCOUNTER — OFFICE VISIT (OUTPATIENT)
Dept: FAMILY MEDICINE CLINIC | Facility: CLINIC | Age: 55
End: 2025-02-27
Payer: COMMERCIAL

## 2025-02-27 VITALS
HEIGHT: 64 IN | BODY MASS INDEX: 25.1 KG/M2 | HEART RATE: 78 BPM | OXYGEN SATURATION: 99 % | SYSTOLIC BLOOD PRESSURE: 136 MMHG | WEIGHT: 147 LBS | RESPIRATION RATE: 16 BRPM | DIASTOLIC BLOOD PRESSURE: 82 MMHG

## 2025-02-27 DIAGNOSIS — R10.13 EPIGASTRIC PAIN: Primary | ICD-10-CM

## 2025-02-27 DIAGNOSIS — R14.0 BLOATING: ICD-10-CM

## 2025-02-27 DIAGNOSIS — M76.892 HAMSTRING TENDINITIS OF LEFT THIGH: ICD-10-CM

## 2025-02-27 PROBLEM — D05.12 DUCTAL CARCINOMA IN SITU (DCIS) OF LEFT BREAST: Status: ACTIVE | Noted: 2025-02-27

## 2025-02-27 PROCEDURE — 99213 OFFICE O/P EST LOW 20 MIN: CPT | Performed by: INTERNAL MEDICINE

## 2025-02-27 NOTE — PROGRESS NOTES
Subjective if complaint is abdominal pain and pain in the back of her left leg  Edna Moreno is a 54 y.o. female.     Abdominal Pain  Pertinent negatives include no fever.  Edna is here today for complaints of abdominal pain.  This is described as being in the mid epigastrium.  It is also little bit in the right upper quadrant.  She has had prior cholecystectomy approximately a year ago.  It is not necessarily related to food but she is getting some bloating type sensation in the upper abdomen.  She is not having fever or chills.  She has not noticed any melena.  Has taken some ibuprofen for some pain in her left arm after surgical procedure.  She also recently had a left breast lumpectomy showing ductal carcinoma in situ.    The following portions of the patient's history were reviewed and updated as appropriate: She  has a past medical history of Cholelithiasis (4/11/24), Colon polyp (4/26/19), Gallstones, GERD (gastroesophageal reflux disease), Hiatal hernia, PVC (premature ventricular contraction), Skin cancer, and Tennis elbow.  She does not have any pertinent problems on file.  Current Outpatient Medications   Medication Sig Dispense Refill    lansoprazole (PREVACID) 30 MG capsule TAKE 1 CAPSULE BY MOUTH EVERY DAY 90 capsule 3    Calcium Carbonate-Vit D-Min (CALCIUM 1200 PO) Take  by mouth Daily. CURRENTLY NOT TAKING (Patient not taking: Reported on 6/3/2024)      Drysol 20 % external solution As Needed. (Patient not taking: Reported on 6/3/2024)      Vitamin D, Cholecalciferol, (CHOLECALCIFEROL) 10 MCG (400 UNIT) tablet Take 1 tablet by mouth Daily. CURRENTLY NOT TAKING (Patient not taking: Reported on 6/3/2024)       No current facility-administered medications for this visit.     She is allergic to hydrocodone..    Review of Systems   Constitutional:  Negative for chills and fever.   Gastrointestinal:  Positive for abdominal distention and abdominal pain. Negative for blood in stool.   Neurological:   Negative for weakness.     Objective   Physical Exam  Vitals and nursing note reviewed.   Constitutional:       Appearance: Normal appearance.   Cardiovascular:      Rate and Rhythm: Normal rate.   Pulmonary:      Effort: Pulmonary effort is normal.   Abdominal:      General: Bowel sounds are normal.      Palpations: Abdomen is soft.      Tenderness: There is no abdominal tenderness. There is no guarding or rebound.   Musculoskeletal:      Right lower leg: No edema.      Left lower leg: No edema.      Comments: She has normal-appearing hamstring tendons and insertions.  She really does not have much tenderness in the area that she points to is painful.   Neurological:      Mental Status: She is alert.     Assessment & Plan   Diagnoses and all orders for this visit:    1. Epigastric pain (Primary)  -     CBC & Differential; Future  -     Comprehensive Metabolic Panel; Future  -     Sedimentation Rate; Future  -     Amylase; Future  -     Lipase; Future    2. Bloating    3. Hamstring tendinitis of left thigh    Edna is here today for some abdominal pain.  Her exam is fairly unremarkable.  I am going to have her come tomorrow to get some lab work.  She is also experiencing some pain in her left hamstring area.  However I do not detect much tenderness and certainly not feel any type of mass.  We did advise icing this area as well as stretching exercises.

## 2025-05-19 RX ORDER — LANSOPRAZOLE 30 MG/1
30 CAPSULE, DELAYED RELEASE ORAL DAILY
Qty: 90 CAPSULE | Refills: 1 | Status: SHIPPED | OUTPATIENT
Start: 2025-05-19

## 2025-07-21 ENCOUNTER — HOSPITAL ENCOUNTER (EMERGENCY)
Facility: HOSPITAL | Age: 55
Discharge: HOME | End: 2025-07-21
Attending: EMERGENCY MEDICINE
Payer: COMMERCIAL

## 2025-07-21 ENCOUNTER — APPOINTMENT (OUTPATIENT)
Dept: ULTRASOUND IMAGING | Facility: HOSPITAL | Age: 55
End: 2025-07-21
Payer: COMMERCIAL

## 2025-07-21 VITALS
RESPIRATION RATE: 18 BRPM | SYSTOLIC BLOOD PRESSURE: 138 MMHG | WEIGHT: 147.7 LBS | BODY MASS INDEX: 25.22 KG/M2 | TEMPERATURE: 97 F | DIASTOLIC BLOOD PRESSURE: 88 MMHG | HEIGHT: 64 IN | HEART RATE: 75 BPM | OXYGEN SATURATION: 97 %

## 2025-07-21 DIAGNOSIS — I83.91 VARICOSE VEINS OF RIGHT LOWER EXTREMITY, UNSPECIFIED WHETHER COMPLICATED: Primary | ICD-10-CM

## 2025-07-21 PROCEDURE — 99284 EMERGENCY DEPT VISIT MOD MDM: CPT

## 2025-07-21 PROCEDURE — 93971 EXTREMITY STUDY: CPT

## 2025-07-21 PROCEDURE — G0463 HOSPITAL OUTPT CLINIC VISIT: HCPCS | Performed by: EMERGENCY MEDICINE

## 2025-07-21 PROCEDURE — 99284 EMERGENCY DEPT VISIT MOD MDM: CPT | Performed by: EMERGENCY MEDICINE

## 2025-07-21 NOTE — FSED PROVIDER NOTE
Subjective   History of Present Illness  56yo female pmh significant hyperlipidemia/gerd/(L) breast DCIS presents ED c/o 3d hx right anteromedial calf prominent swelling, possible varicosity with mild tenderness, nontraumatic.  ROS neg fever/cough/chest pain/soa/hemoptysis/hx vte.    History provided by:  Patient  Leg Swelling  Associated symptoms: myalgias        Review of Systems   Constitutional: Negative.    HENT: Negative.     Eyes: Negative.    Respiratory: Negative.     Cardiovascular: Negative.    Gastrointestinal: Negative.    Musculoskeletal:  Positive for myalgias.   All other systems reviewed and are negative.      Past Medical History:   Diagnosis Date    Cholelithiasis 4/11/24    PAIN COMES AND GOES WITH EATING    Colon polyp 4/26/19    Most recent 9/22/22    Gallstones     GERD (gastroesophageal reflux disease)     Hiatal hernia     PVC (premature ventricular contraction)     Skin cancer     Tennis elbow        Allergies   Allergen Reactions    Hydrocodone Nausea Only       Past Surgical History:   Procedure Laterality Date    BREAST LUMPECTOMY Left 02/17/2025    CHOLECYSTECTOMY WITH INTRAOPERATIVE CHOLANGIOGRAM N/A 05/17/2024    Procedure: laparoscopic cholecystectomy with cholangiogram, possible open;  Surgeon: Zenobia Gooden MD;  Location: [facility] MAIN OR;  Service: General;  Laterality: N/A;    COLONOSCOPY N/A 04/26/2019    Procedure: COLONOSCOPY into cecum with cold snare polypectomies x4, one unretrieved polyp from ascending colon;  Surgeon: Devang Omer MD;  Location: [facility] ENDOSCOPY;  Service: Gastroenterology    COLONOSCOPY N/A 09/22/2022    Procedure: COLONOSCOPY to cecum and TI with cold biopsy polypectomies;  Surgeon: Enrico Traore MD;  Location: [facility] ENDOSCOPY;  Service: Gastroenterology;  Laterality: N/A;  pre: h/o colon polyps  post: polyps    ENDOSCOPY      X2    WRIST SURGERY Left        Family History   Problem Relation Age of Onset    Osteoporosis Mother     Cancer Paternal  Aunt     David Hyperthermia Neg Hx        Social History     Socioeconomic History    Marital status: Single   Tobacco Use    Smoking status: Never    Smokeless tobacco: Never   Vaping Use    Vaping status: Never Used   Substance and Sexual Activity    Alcohol use: Yes     Comment: Occasional weekend    Drug use: Never    Sexual activity: Not Currently           Objective   Physical Exam  Vitals and nursing note reviewed.   Constitutional:       Appearance: Normal appearance.   HENT:      Head: Normocephalic and atraumatic.      Right Ear: External ear normal.      Left Ear: External ear normal.      Nose: Nose normal.      Mouth/Throat:      Pharynx: Oropharynx is clear. No oropharyngeal exudate or posterior oropharyngeal erythema.   Eyes:      Pupils: Pupils are equal, round, and reactive to light.   Cardiovascular:      Rate and Rhythm: Normal rate and regular rhythm.      Pulses: Normal pulses.      Heart sounds: Normal heart sounds. No murmur heard.     No friction rub. No gallop.   Pulmonary:      Effort: Pulmonary effort is normal.      Breath sounds: Normal breath sounds. No wheezing, rhonchi or rales.   Abdominal:      General: Abdomen is flat. Bowel sounds are normal. There is no distension.      Palpations: Abdomen is soft.      Tenderness: There is no abdominal tenderness.   Musculoskeletal:         General: Tenderness present. No swelling, deformity or signs of injury.      Cervical back: Normal range of motion and neck supple. No rigidity.      Right lower leg: No edema.      Left lower leg: No edema.        Legs:    Lymphadenopathy:      Cervical: No cervical adenopathy.   Skin:     General: Skin is warm and dry.   Neurological:      General: No focal deficit present.      Mental Status: She is alert and oriented to person, place, and time.      GCS: GCS eye subscore is 4. GCS verbal subscore is 5. GCS motor subscore is 6.         Procedures           ED Course      US Venous Doppler Lower Extremity  Right (duplex)  Result Date: 7/21/2025  Narrative: Patient: KESHIA BRIDGES  Time Out: 09:32 Exam(s): US VENOUS RIGHT LOWER EXTREMITY EXAM:   US Duplex Right Lower Extremity Veins CLINICAL HISTORY:      Calf swelling. TECHNIQUE:   Real-time duplex ultrasound scan of the right lower extremity veins integrating B-mode two-dimensional vascular structure, Doppler spectral analysis, color flow Doppler imaging and compression. COMPARISON:   No relevant prior studies available. FINDINGS:   Deep veins:  Unremarkable.  No Deep vein thrombosis in the visualized common femoral, femoral, proximal deep femoral or popliteal veins.  The veins demonstrate normal color flow, are normally compressible, with normal phasic flow and or augmentation response.   Superficial veins:  Unremarkable.  No thrombus in the visualized great saphenous vein.   Soft tissues:  No acute findings.  No popliteal cyst. IMPRESSION:       No deep vein thrombosis of the right lower extremity.     Impression: Electronically signed by Johanne Flores MD on 07-21-25 at 0932                                         Medical Decision Making  Problems Addressed:  Varicose veins of right lower extremity, unspecified whether complicated: acute illness or injury        Final diagnoses:   Varicose veins of right lower extremity, unspecified whether complicated       ED Disposition  ED Disposition       ED Disposition   Discharge    Condition   Good    Comment   --               Richard Montano MD  [address] [id number]  [phone]    Schedule an appointment as soon as possible for a visit            Medication List      No changes were made to your prescriptions during this visit.

## (undated) DEVICE — GLV SURG BIOGEL M LTX PF 6 1/2

## (undated) DEVICE — TUBING, SUCTION, 1/4" X 10', STRAIGHT: Brand: MEDLINE

## (undated) DEVICE — CANN NASL CO2 TRULINK W/O2 A/

## (undated) DEVICE — THE TORRENT IRRIGATION SCOPE CONNECTOR IS USED WITH THE TORRENT IRRIGATION TUBING TO PROVIDE IRRIGATION FLUIDS SUCH AS STERILE WATER DURING GASTROINTESTINAL ENDOSCOPIC PROCEDURES WHEN USED IN CONJUNCTION WITH AN IRRIGATION PUMP (OR ELECTROSURGICAL UNIT).: Brand: TORRENT

## (undated) DEVICE — ENDOPATH XCEL BLADELESS TROCARS WITH STABILITY SLEEVES: Brand: ENDOPATH XCEL

## (undated) DEVICE — SUT VIC 0 TN 27IN DYED JTN0G

## (undated) DEVICE — SNAR POLYP SENSATION STDOVL 27 240 BX40

## (undated) DEVICE — LAPAROSCOPIC SMOKE FILTRATION SYSTEM: Brand: PALL LAPAROSHIELD® PLUS LAPAROSCOPIC SMOKE FILTRATION SYSTEM

## (undated) DEVICE — SOL NACL 0.9PCT 1000ML

## (undated) DEVICE — PK LAP CHOLE BG

## (undated) DEVICE — SUT VIC 5/0 PS2 18IN J495H

## (undated) DEVICE — SENSR O2 OXIMAX FNGR A/ 18IN NONSTR

## (undated) DEVICE — HYPODERMIC SAFETY NEEDLE: Brand: MONOJECT

## (undated) DEVICE — DISPOSABLE MONOPOLAR ENDOSCOPIC CORD 10 FT. (3M): Brand: KIRWAN

## (undated) DEVICE — Device: Brand: DEFENDO AIR/WATER/SUCTION AND BIOPSY VALVE

## (undated) DEVICE — KT ORCA ORCAPOD DISP STRL

## (undated) DEVICE — SUT VIC 3/0 CT2 27IN J232H

## (undated) DEVICE — ENDOPATH PNEUMONEEDLE INSUFFLATION NEEDLES WITH LUER LOCK CONNECTORS 120MM: Brand: ENDOPATH

## (undated) DEVICE — APPL CHLORAPREP HI/LITE 26ML ORNG

## (undated) DEVICE — LN SMPL CO2 SHTRM SD STREAM W/M LUER

## (undated) DEVICE — SINGLE-USE BIOPSY FORCEPS: Brand: RADIAL JAW 4

## (undated) DEVICE — STPCK 3WY D201 DISCOFIX

## (undated) DEVICE — LAPAROVUE VISIBILITY SYSTEM LAPAROSCOPIC SOLUTIONS: Brand: LAPAROVUE

## (undated) DEVICE — ENDOPOUCH RETRIEVER SPECIMEN RETRIEVAL BAGS: Brand: ENDOPOUCH RETRIEVER

## (undated) DEVICE — ADAPT CLN BIOGUARD AIR/H2O DISP

## (undated) DEVICE — THE SINGLE USE ETRAP – POLYP TRAP IS USED FOR SUCTION RETRIEVAL OF ENDOSCOPICALLY REMOVED POLYPS.: Brand: ETRAP

## (undated) DEVICE — CANN O2 ETCO2 FITS ALL CONN CO2 SMPL A/ 7IN DISP LF